# Patient Record
Sex: FEMALE | Race: ASIAN | NOT HISPANIC OR LATINO | URBAN - METROPOLITAN AREA
[De-identification: names, ages, dates, MRNs, and addresses within clinical notes are randomized per-mention and may not be internally consistent; named-entity substitution may affect disease eponyms.]

---

## 2017-01-29 PROBLEM — Z00.00 ENCOUNTER FOR PREVENTIVE HEALTH EXAMINATION: Status: ACTIVE | Noted: 2017-01-29

## 2017-11-19 ENCOUNTER — EMERGENCY (EMERGENCY)
Facility: HOSPITAL | Age: 32
LOS: 1 days | Discharge: HOME | End: 2017-11-19
Attending: EMERGENCY MEDICINE | Admitting: EMERGENCY MEDICINE
Payer: COMMERCIAL

## 2017-11-19 VITALS
TEMPERATURE: 99 F | SYSTOLIC BLOOD PRESSURE: 118 MMHG | DIASTOLIC BLOOD PRESSURE: 79 MMHG | OXYGEN SATURATION: 100 % | HEART RATE: 79 BPM | RESPIRATION RATE: 18 BRPM

## 2017-11-19 PROCEDURE — 99284 EMERGENCY DEPT VISIT MOD MDM: CPT | Mod: 25

## 2017-11-19 PROCEDURE — 99053 MED SERV 10PM-8AM 24 HR FAC: CPT

## 2017-11-19 NOTE — ED PROVIDER NOTE - MEDICAL DECISION MAKING DETAILS
33 y/o F BIB EMS after an argument.  Pt is not displaying any signs of intoxication, she denies SI/HI.  She does not have any complaints and does not appear to be a harm to herself or others.  Pt states she feels safe going home, will dc in care of boyfriend who is coming to pick her up.

## 2017-11-19 NOTE — ED ADULT TRIAGE NOTE - CHIEF COMPLAINT QUOTE
Pt arrives via EMS from home. Pt states she had an argument with her boyfriend and that her brother called 911 stating patient was violent and suicidal. Pt arrives pleasant, calm and cooperative. Admits to having an argument but denies making any suicidal statements. Pt denies psychiatric hx; denies all medical complaints. Per EMS, pt was in the living room watching TV and everything in the room was neat and orderly.

## 2017-11-19 NOTE — ED PROVIDER NOTE - OBJECTIVE STATEMENT
31 y/o F with h/o DM on metformin BIB EMS after an argument with her boyfriend.  Pt states that she had a verbal argument with her boyfriend earlier this evening.  Her brother overhead the argument and called EMS.  Pt was unaware that her brother called EMS and does not know why he called.  EMS reports that they received a call for someone making suicidal threats.  Pt denies physical altercation or assault.  No reported injuries or trauma.  Pt denies any suicidal ideations or thoughts of hurting others.  States her brother left to go DJ a party.  Pt called her boyfriend on the way to the hospital, and he is coming to pick her up.  Pt lives at home with boyfriend, daughter, and mother (daughter is at home with mother currently) and recently invited her brother to move in.  She states she feels safe going home.

## 2019-09-28 ENCOUNTER — EMERGENCY (EMERGENCY)
Facility: HOSPITAL | Age: 34
LOS: 1 days | Discharge: HOME | End: 2019-09-28
Attending: EMERGENCY MEDICINE | Admitting: EMERGENCY MEDICINE
Payer: MEDICAID

## 2019-09-28 VITALS
DIASTOLIC BLOOD PRESSURE: 63 MMHG | TEMPERATURE: 100 F | SYSTOLIC BLOOD PRESSURE: 98 MMHG | HEART RATE: 102 BPM | RESPIRATION RATE: 16 BRPM | OXYGEN SATURATION: 102 %

## 2019-09-28 VITALS
HEART RATE: 100 BPM | RESPIRATION RATE: 19 BRPM | OXYGEN SATURATION: 100 % | TEMPERATURE: 101 F | DIASTOLIC BLOOD PRESSURE: 60 MMHG | SYSTOLIC BLOOD PRESSURE: 105 MMHG

## 2019-09-28 PROBLEM — E11.9 TYPE 2 DIABETES MELLITUS WITHOUT COMPLICATIONS: Chronic | Status: ACTIVE | Noted: 2017-11-19

## 2019-09-28 LAB
ALBUMIN SERPL ELPH-MCNC: 4.1 G/DL — SIGNIFICANT CHANGE UP (ref 3.3–5)
ALP SERPL-CCNC: 65 U/L — SIGNIFICANT CHANGE UP (ref 40–120)
ALT FLD-CCNC: 59 U/L — HIGH (ref 4–33)
ANION GAP SERPL CALC-SCNC: 14 MMO/L — SIGNIFICANT CHANGE UP (ref 7–14)
AST SERPL-CCNC: 52 U/L — HIGH (ref 4–32)
BILIRUB SERPL-MCNC: 0.4 MG/DL — SIGNIFICANT CHANGE UP (ref 0.2–1.2)
BUN SERPL-MCNC: 6 MG/DL — LOW (ref 7–23)
CALCIUM SERPL-MCNC: 9.5 MG/DL — SIGNIFICANT CHANGE UP (ref 8.4–10.5)
CHLORIDE SERPL-SCNC: 99 MMOL/L — SIGNIFICANT CHANGE UP (ref 98–107)
CO2 SERPL-SCNC: 25 MMOL/L — SIGNIFICANT CHANGE UP (ref 22–31)
CREAT SERPL-MCNC: 0.8 MG/DL — SIGNIFICANT CHANGE UP (ref 0.5–1.3)
GLUCOSE SERPL-MCNC: 195 MG/DL — HIGH (ref 70–99)
HCT VFR BLD CALC: 39.7 % — SIGNIFICANT CHANGE UP (ref 34.5–45)
HGB BLD-MCNC: 12.7 G/DL — SIGNIFICANT CHANGE UP (ref 11.5–15.5)
MCHC RBC-ENTMCNC: 28.9 PG — SIGNIFICANT CHANGE UP (ref 27–34)
MCHC RBC-ENTMCNC: 32 % — SIGNIFICANT CHANGE UP (ref 32–36)
MCV RBC AUTO: 90.2 FL — SIGNIFICANT CHANGE UP (ref 80–100)
NRBC # FLD: 0 K/UL — SIGNIFICANT CHANGE UP (ref 0–0)
PLATELET # BLD AUTO: 173 K/UL — SIGNIFICANT CHANGE UP (ref 150–400)
PMV BLD: 9.7 FL — SIGNIFICANT CHANGE UP (ref 7–13)
POTASSIUM SERPL-MCNC: 4 MMOL/L — SIGNIFICANT CHANGE UP (ref 3.5–5.3)
POTASSIUM SERPL-SCNC: 4 MMOL/L — SIGNIFICANT CHANGE UP (ref 3.5–5.3)
PROT SERPL-MCNC: 7.5 G/DL — SIGNIFICANT CHANGE UP (ref 6–8.3)
RBC # BLD: 4.4 M/UL — SIGNIFICANT CHANGE UP (ref 3.8–5.2)
RBC # FLD: 12.6 % — SIGNIFICANT CHANGE UP (ref 10.3–14.5)
SODIUM SERPL-SCNC: 138 MMOL/L — SIGNIFICANT CHANGE UP (ref 135–145)
WBC # BLD: 8.81 K/UL — SIGNIFICANT CHANGE UP (ref 3.8–10.5)
WBC # FLD AUTO: 8.81 K/UL — SIGNIFICANT CHANGE UP (ref 3.8–10.5)

## 2019-09-28 PROCEDURE — 99283 EMERGENCY DEPT VISIT LOW MDM: CPT

## 2019-09-28 RX ORDER — SODIUM CHLORIDE 9 MG/ML
1000 INJECTION INTRAMUSCULAR; INTRAVENOUS; SUBCUTANEOUS ONCE
Refills: 0 | Status: COMPLETED | OUTPATIENT
Start: 2019-09-28 | End: 2019-09-28

## 2019-09-28 RX ORDER — ACETAMINOPHEN 500 MG
650 TABLET ORAL ONCE
Refills: 0 | Status: COMPLETED | OUTPATIENT
Start: 2019-09-28 | End: 2019-09-28

## 2019-09-28 RX ORDER — SODIUM CHLORIDE 9 MG/ML
1000 INJECTION INTRAMUSCULAR; INTRAVENOUS; SUBCUTANEOUS ONCE
Refills: 0 | Status: DISCONTINUED | OUTPATIENT
Start: 2019-09-28 | End: 2019-10-05

## 2019-09-28 RX ORDER — FLUTICASONE PROPIONATE 50 MCG
1 SPRAY, SUSPENSION NASAL ONCE
Refills: 0 | Status: DISCONTINUED | OUTPATIENT
Start: 2019-09-28 | End: 2019-09-28

## 2019-09-28 RX ORDER — IBUPROFEN 200 MG
400 TABLET ORAL ONCE
Refills: 0 | Status: COMPLETED | OUTPATIENT
Start: 2019-09-28 | End: 2019-09-28

## 2019-09-28 RX ADMIN — Medication 400 MILLIGRAM(S): at 18:37

## 2019-09-28 RX ADMIN — Medication 650 MILLIGRAM(S): at 16:34

## 2019-09-28 RX ADMIN — SODIUM CHLORIDE 1000 MILLILITER(S): 9 INJECTION INTRAMUSCULAR; INTRAVENOUS; SUBCUTANEOUS at 16:34

## 2019-09-28 NOTE — ED PROVIDER NOTE - NS ED ROS FT
REVIEW OF SYSTEMS:  General:  +fever, no chills  HEENT: +headache, no vision changes  Cardiac: no chest pain, no palpitations  Respiratory: +cough, no shortness of breath  Gastrointestinal: no abdominal pain, +nausea, +vomiting, no diarrhea  Genitourinary: no hematuria, no dysuria, no urinary frequency  Extremities: no extremity swelling, no extremity pain  Neuro: no focal weakness, no numbness/tingling of the extremities, no decreased sensation  Heme: no easy bleeding, no easy bruising  Skin: no jaundice,  no rashes, no lesions  All other ROS as documented in HPI  -Felipe Burns, PGY-2

## 2019-09-28 NOTE — ED PROVIDER NOTE - PATIENT PORTAL LINK FT
You can access the FollowMyHealth Patient Portal offered by [facility] by registering at the following website: [email or web address]. By joining [employer]’s FollowMyHealth portal, you will also be able to view your health information using other applications (apps) compatible with our system.

## 2019-09-28 NOTE — ED PROVIDER NOTE - CLINICAL SUMMARY MEDICAL DECISION MAKING FREE TEXT BOX
35F flu+ at urgi. Clinically well appearing. tylenol, fluids, labs, ekg. Monitor for sepsis. dispo is likely home 35F PMH DM, flu+ at urgi. Clinically well appearing. tylenol, fluids, labs, ekg. Monitor for sepsis. dispo is likely home

## 2019-09-28 NOTE — ED PROVIDER NOTE - NSFOLLOWUPINSTRUCTIONS_ED_ALL_ED_FT
Please follow up with your PMD in the next few days.     Please see the attached paper for additional information on the flu and when to return to the ER    Viral Respiratory Infection    A viral respiratory infection is an illness that affects parts of the body used for breathing, like the lungs, nose, and throat. It is caused by a germ called a virus. Symptoms can include runny nose, coughing, sneezing, fatigue, body aches, sore throat, fever, or headache. Over the counter medicine can be used to manage the symptoms but the infection typically goes away on its own in 5 to 10 days.     SEEK IMMEDIATE MEDICAL CARE IF YOU HAVE ANY OF THE FOLLOWING SYMPTOMS: shortness of breath, chest pain, fever over 10 days, or lightheadedness/dizziness.

## 2019-09-28 NOTE — ED ADULT NURSE NOTE - OBJECTIVE STATEMENT
break coverage rn: patient arrives a*ox4 ambulatory to room 15 sent in by urgent care for low blood pressure readings and a positive flu b test this afternoon. pt states her daughter was sick and thinks she caught it form her, denies chest pain sob or dizziness, patient appears comfortable and in no distress, resps even and unlabored,spo2 100% on room air, 20G IVSL placed pending further orders at this time, bed in lowest position with call bell in reach, will ctm closely.

## 2019-09-28 NOTE — ED PROVIDER NOTE - OBJECTIVE STATEMENT
34F PMH DM p/w from [facility] care for flu. States has had 3 days of sore throat, chills, tired, body aches, coughing. +flu at [facility]. Vomiting x1, no abdominal pain or urinary sx. Took mucinex. 34F PMH DM p/w from [facility] care for flu. States has had 3 days of sore throat, chills, tired, body aches, coughing. +flu at [facility]. Vomiting x1, no abdominal pain or urinary sx. Took mucinex, no tylenol. Has child who was also sick with flu a few days ago. 34F PMH DM p/w from [facility] care for flu. States has had 3 days of sore throat, chills, tired, body aches, coughing. +flu at [facility]. Vomiting x1, no abdominal pain or urinary sx. Took mucinex, no tylenol. Has child who was also sick with flu a few days ago.    Attending/Ciaarn: 35 yo M w/ Dm noncompliant with Metformin medication, p/w three days of subjective fever/chills, myalgias, nasal congestion, nonproductive cough and fatigue/generalized weakness. +sick contact-daughter

## 2019-09-28 NOTE — ED PROVIDER NOTE - PHYSICAL EXAMINATION
General: Well developed, well nourished, in no acute distress, speaking in full sentence  HEENT: Normocephalic and atraumatic, Trachea midline.   Cardiac: Normal S1 and S2 w/ RRR. No MRG.  Pulmonary: CTA bilaterally. No increased WOB.   Abdominal: Soft, NTND  Neurologic: No focal sensory or motor deficits.  Musculoskeletal: No limited ROM.  Vascular: Warm and well perfused  Skin: Color appropriate for race.   Psychiatric: Appropriate mood and affect. No apparent risk to self or others.  Felipe Burns, PGY-2 General: Well developed, well nourished, in no acute distress, speaking in full sentence  HEENT: Normocephalic and atraumatic, Trachea midline.   Cardiac: Normal S1 and S2 w/ RRR. No MRG.  Pulmonary: CTA bilaterally. No increased WOB.   Abdominal: Soft, NTND  Neurologic: No focal sensory or motor deficits.  Musculoskeletal: No limited ROM.  Vascular: Warm and well perfused  Skin: Color appropriate for race.   Psychiatric: Appropriate mood and affect. No apparent risk to self or others.  Felipe Burns, PGY-2    Attending/Ciaran: non-toxic appearing, NAD; PERRL/EOMI, non-icterus, +nasal-inflammed, clear mucus, supple, no DANY, no JVD, RRR, CTAB; Abd-soft, NT/ND, no HSM; no LE edema, no muscle PT, A&Ox3, nonfocal; Skin-warm/dry/no rashes

## 2019-09-28 NOTE — ED ADULT NURSE NOTE - NSIMPLEMENTINTERV_GEN_ALL_ED
Implemented All Universal Safety Interventions:  [place] to call system. Call bell, personal items and telephone within reach. Instruct patient to call for assistance. Room bathroom lighting operational. Non-slip footwear when patient is off stretcher. Physically safe environment: no spills, clutter or unnecessary equipment. Stretcher in lowest position, wheels locked, appropriate side rails in place.

## 2020-10-27 ENCOUNTER — EMERGENCY (EMERGENCY)
Facility: HOSPITAL | Age: 35
LOS: 1 days | Discharge: HOME | End: 2020-10-27
Attending: EMERGENCY MEDICINE | Admitting: EMERGENCY MEDICINE
Payer: MEDICAID

## 2020-10-27 VITALS
RESPIRATION RATE: 18 BRPM | OXYGEN SATURATION: 100 % | HEART RATE: 77 BPM | SYSTOLIC BLOOD PRESSURE: 105 MMHG | TEMPERATURE: 98 F | DIASTOLIC BLOOD PRESSURE: 65 MMHG

## 2020-10-27 LAB
ALBUMIN SERPL ELPH-MCNC: 3.8 G/DL — SIGNIFICANT CHANGE UP (ref 3.3–5)
ALP SERPL-CCNC: 55 U/L — SIGNIFICANT CHANGE UP (ref 40–120)
ALT FLD-CCNC: 17 U/L — SIGNIFICANT CHANGE UP (ref 4–33)
ANION GAP SERPL CALC-SCNC: 11 MMO/L — SIGNIFICANT CHANGE UP (ref 7–14)
APPEARANCE UR: SIGNIFICANT CHANGE UP
AST SERPL-CCNC: 19 U/L — SIGNIFICANT CHANGE UP (ref 4–32)
BASOPHILS # BLD AUTO: 0.03 K/UL — SIGNIFICANT CHANGE UP (ref 0–0.2)
BASOPHILS NFR BLD AUTO: 0.4 % — SIGNIFICANT CHANGE UP (ref 0–2)
BILIRUB SERPL-MCNC: < 0.2 MG/DL — LOW (ref 0.2–1.2)
BILIRUB UR-MCNC: NEGATIVE — SIGNIFICANT CHANGE UP
BLD GP AB SCN SERPL QL: NEGATIVE — SIGNIFICANT CHANGE UP
BLOOD UR QL VISUAL: SIGNIFICANT CHANGE UP
BUN SERPL-MCNC: 9 MG/DL — SIGNIFICANT CHANGE UP (ref 7–23)
CALCIUM SERPL-MCNC: 9 MG/DL — SIGNIFICANT CHANGE UP (ref 8.4–10.5)
CHLORIDE SERPL-SCNC: 103 MMOL/L — SIGNIFICANT CHANGE UP (ref 98–107)
CO2 SERPL-SCNC: 24 MMOL/L — SIGNIFICANT CHANGE UP (ref 22–31)
COLOR SPEC: SIGNIFICANT CHANGE UP
CREAT SERPL-MCNC: 0.51 MG/DL — SIGNIFICANT CHANGE UP (ref 0.5–1.3)
EOSINOPHIL # BLD AUTO: 0.3 K/UL — SIGNIFICANT CHANGE UP (ref 0–0.5)
EOSINOPHIL NFR BLD AUTO: 3.5 % — SIGNIFICANT CHANGE UP (ref 0–6)
GLUCOSE SERPL-MCNC: 197 MG/DL — HIGH (ref 70–99)
GLUCOSE UR-MCNC: >1000 — HIGH
HCG SERPL-ACNC: 3510 MIU/ML — SIGNIFICANT CHANGE UP
HCT VFR BLD CALC: 36 % — SIGNIFICANT CHANGE UP (ref 34.5–45)
HGB BLD-MCNC: 11.6 G/DL — SIGNIFICANT CHANGE UP (ref 11.5–15.5)
IMM GRANULOCYTES NFR BLD AUTO: 0.4 % — SIGNIFICANT CHANGE UP (ref 0–1.5)
INR BLD: 1.07 — SIGNIFICANT CHANGE UP (ref 0.88–1.16)
KETONES UR-MCNC: SIGNIFICANT CHANGE UP
LEUKOCYTE ESTERASE UR-ACNC: HIGH
LYMPHOCYTES # BLD AUTO: 3.34 K/UL — HIGH (ref 1–3.3)
LYMPHOCYTES # BLD AUTO: 39.2 % — SIGNIFICANT CHANGE UP (ref 13–44)
MCHC RBC-ENTMCNC: 28.9 PG — SIGNIFICANT CHANGE UP (ref 27–34)
MCHC RBC-ENTMCNC: 32.2 % — SIGNIFICANT CHANGE UP (ref 32–36)
MCV RBC AUTO: 89.8 FL — SIGNIFICANT CHANGE UP (ref 80–100)
MONOCYTES # BLD AUTO: 0.57 K/UL — SIGNIFICANT CHANGE UP (ref 0–0.9)
MONOCYTES NFR BLD AUTO: 6.7 % — SIGNIFICANT CHANGE UP (ref 2–14)
NEUTROPHILS # BLD AUTO: 4.26 K/UL — SIGNIFICANT CHANGE UP (ref 1.8–7.4)
NEUTROPHILS NFR BLD AUTO: 49.8 % — SIGNIFICANT CHANGE UP (ref 43–77)
NITRITE UR-MCNC: NEGATIVE — SIGNIFICANT CHANGE UP
NRBC # FLD: 0 K/UL — SIGNIFICANT CHANGE UP (ref 0–0)
PH UR: 7 — SIGNIFICANT CHANGE UP (ref 5–8)
PLATELET # BLD AUTO: 216 K/UL — SIGNIFICANT CHANGE UP (ref 150–400)
PMV BLD: 9.7 FL — SIGNIFICANT CHANGE UP (ref 7–13)
POTASSIUM SERPL-MCNC: 4.2 MMOL/L — SIGNIFICANT CHANGE UP (ref 3.5–5.3)
POTASSIUM SERPL-SCNC: 4.2 MMOL/L — SIGNIFICANT CHANGE UP (ref 3.5–5.3)
PROT SERPL-MCNC: 6.3 G/DL — SIGNIFICANT CHANGE UP (ref 6–8.3)
PROT UR-MCNC: 30 — SIGNIFICANT CHANGE UP
PROTHROM AB SERPL-ACNC: 12.2 SEC — SIGNIFICANT CHANGE UP (ref 10.6–13.6)
RBC # BLD: 4.01 M/UL — SIGNIFICANT CHANGE UP (ref 3.8–5.2)
RBC # FLD: 12.5 % — SIGNIFICANT CHANGE UP (ref 10.3–14.5)
RBC CASTS # UR COMP ASSIST: SIGNIFICANT CHANGE UP (ref 0–?)
RH IG SCN BLD-IMP: POSITIVE — SIGNIFICANT CHANGE UP
SODIUM SERPL-SCNC: 138 MMOL/L — SIGNIFICANT CHANGE UP (ref 135–145)
SP GR SPEC: 1.03 — SIGNIFICANT CHANGE UP (ref 1–1.04)
UROBILINOGEN FLD QL: NORMAL — SIGNIFICANT CHANGE UP
WBC # BLD: 8.53 K/UL — SIGNIFICANT CHANGE UP (ref 3.8–10.5)
WBC # FLD AUTO: 8.53 K/UL — SIGNIFICANT CHANGE UP (ref 3.8–10.5)
WBC UR QL: SIGNIFICANT CHANGE UP (ref 0–?)

## 2020-10-27 PROCEDURE — 76817 TRANSVAGINAL US OBSTETRIC: CPT | Mod: 26

## 2020-10-27 PROCEDURE — 99285 EMERGENCY DEPT VISIT HI MDM: CPT

## 2020-10-27 NOTE — ED PROVIDER NOTE - CLINICAL SUMMARY MEDICAL DECISION MAKING FREE TEXT BOX
36 y/o female with a hx of DM presents to the ER c/o 3 days of vaginal spotting and 1 day of heavy bleeding with clots and cramping. Pt is well appearing, NAD, UCG+ in the ER, will check labs, US, follow up OB/GYN.

## 2020-10-27 NOTE — ED ADULT NURSE NOTE - OBJECTIVE STATEMENT
Received pt in intake room 12 with vaginal bleeding from today with lower abdominal cramping . patient also c/o nausea and headache. No c/o dizziness. +some generalized weakness.  20G IV placed to the left AC. Labs send.

## 2020-10-27 NOTE — ED ADULT TRIAGE NOTE - CHIEF COMPLAINT QUOTE
pt c/o heavy vaginal bleeding and lower abdominal cramps states "I think I'm having a miscarriage." also c/o headache and nausea. LMP 20. . PMH DMT2.

## 2020-10-27 NOTE — ED PROVIDER NOTE - PATIENT PORTAL LINK FT
You can access the FollowMyHealth Patient Portal offered by [facility] by registering at the following website: [email or web address]. By joining [employer]’s FollowMyHealth portal, you will also be able to view your health information using other applications (apps) compatible with our system.

## 2020-10-27 NOTE — ED PROVIDER NOTE - NSFOLLOWUPINSTRUCTIONS_ED_ALL_ED_FT
Advance activity as tolerated.  Continue all previously prescribed medications as directed unless otherwise instructed.  Follow up with your primary care physician in 48-72 hours- bring copies of your results.  Return to the ER for worsening or persistent symptoms, and/or ANY NEW OR CONCERNING SYMPTOMS. If you have issues obtaining follow up, please call: [phone] to obtain a doctor or specialist who takes your insurance in your area.  You may call [phone] to make an appointment with the internal medicine clinic.     Return to the ED in 2 days for repeat evaluation by our OB/GYN team, if bleeding >1 pad per hour return to the ED sooner. Vaginal bleeding including small/medium sized clots and pelvic cramps is expected after taking the medication Cytotec. Take Motrin or Tylenol for pain control.

## 2020-10-27 NOTE — ED PROVIDER NOTE - PROGRESS NOTE DETAILS
BUBBA Richardson. case signed out from BUBBA Armijo, pt w/ incomplete AB, evaluated by OB/GYN, recommending Cytotec 600 buccally, return in 48 hours for repeat eval in ED.

## 2020-10-27 NOTE — ED ADULT NURSE NOTE - NSIMPLEMENTINTERV_GEN_ALL_ED
Implemented All Universal Safety Interventions:  [place] to call system. Call bell, personal items and telephone within reach. Instruct patient to call for assistance. Room bathroom lighting operational. Non-slip footwear when patient is off stretcher. Physically safe environment: no spills, clutter or unnecessary equipment. Stretcher in lowest position, wheels locked, appropriate side rails in place.

## 2020-10-27 NOTE — ED PROVIDER NOTE - OBJECTIVE STATEMENT
36 y/o female with a hx of DM presents to the ER c/o 3 days of vaginal spotting and 1 day of heavy bleeding with clots and cramping.  Pt states she thinks she is pregnant but reports a negative test 2 weeks ago.  LMP .  .  Pt denies fevers, chills, nausea, vomiting, abdominal pain, dysuria, frequency, discharge.

## 2020-10-27 NOTE — ED PROVIDER NOTE - ATTENDING CONTRIBUTION TO CARE
Attending note:   After face to face evaluation of this patient, I concur with above noted hx, pe, and care plan for this patient.  [place]ato: 35 yof with complaints of vaginal bleeding. LMP over 2 months ago with negative ucg one month prior. Today, pt became concerned about heavy clots while in shower, no significant abd pain or nausea, no vomiting, no fever. No trauma. On exam, pt is well appearing, no distress, clear lungs, normal cardiac, abd soft and non tender, pelvic as above, skin and msk normal. Pt is pregnant, by dates 8 weeks, will get labs and US. Threatened ab vs miscarriage.

## 2020-10-28 VITALS
SYSTOLIC BLOOD PRESSURE: 111 MMHG | HEART RATE: 86 BPM | DIASTOLIC BLOOD PRESSURE: 67 MMHG | OXYGEN SATURATION: 100 % | RESPIRATION RATE: 16 BRPM | TEMPERATURE: 98 F

## 2020-10-28 DIAGNOSIS — O36.80X0 PREGNANCY WITH INCONCLUSIVE FETAL VIABILITY, NOT APPLICABLE OR UNSPECIFIED: ICD-10-CM

## 2020-10-28 NOTE — CONSULT NOTE ADULT - SUBJECTIVE AND OBJECTIVE BOX
GYN ED CONSULT NOTE    SUBJECTIVE:    34yo  lmp~20? PMH significant for DM2 presenting with 3 days of vaginal bleeding similar to a menses, followed by one day of heavier bleeding with passage of clots and what looked like tissue at home. Pt states she has irregular menses and thought she had been pregnant about 2 weeks ago. She states she took home pregnancy tests that were neg. She came to the ED tonight because of the bleeding and thought she had a miscarriage when she saw the tissue at home.    She now has been using one pad q3 hours. S      Pt denies fever, chills, nausea, vomiting, diarrhea, headache, constipation, dizzyness, syncope, chest pain, palpitations, shortness of breath, dysuria, urgency, frequency, ,  breast lumps/bumps, dyspareunia.        Primary OB/GYN: none  OBH:   2013- FTNSVD  GYNH: denies hx of fibroids, ov cysts, abnl paps, sti  PMSH:DM2  MEDS: MEFORMIN  ALL: nkda  SOCH: denies t/e/d; safe at home  FAMH: denies fam hx of breast/uterine/ovarian/colon cancer  ROS: negative except per hpi    OBJECTIVE:    VITAL SIGNS:  Vital Signs Last 24 Hrs  T(C): 36.9 (28 Oct 2020 01:41), Max: 36.9 (28 Oct 2020 01:41)  T(F): 98.4 (28 Oct 2020 01:41), Max: 98.4 (28 Oct 2020 01:41)  HR: 86 (28 Oct 2020 01:41) (77 - 86)  BP: 111/67 (28 Oct 2020 01:41) (105/65 - 111/67)  BP(mean): --  RR: 16 (28 Oct 2020 01:41) (16 - 18)  SpO2: 100% (28 Oct 2020 01:41) (100% - 100%)    CAPILLARY BLOOD GLUCOSE      POCT Blood Glucose.: 227 mg/dL (27 Oct 2020 21:25)        PHYSICAL EXAM:  GEN: NAD, A&Ox3  CV: RRR, no m/g/r  LUNGS: CTAB  ABD: soft, NT, ND, +BS  SPECULUM: CERVIX is 0.5cm dilated. No active bleeding with valsalva. Old clots removed    PELVIC: no adnexal tenderness. No CMT tenderness. 0.5cm dilated parous cervix   EXT: WWP, no edema/TTP    LABS:             [id number]=[id number]             11.6   8.53  )-----------( 216      ( 27 Oct 2020 22:29 )             36.0   baso 0.4    eos 3.5    imm gran 0.4    lymph 39.2   mono 6.7    poly 49.8       10-27    138  |  103  |  9   ----------------------------<  197<H>  4.2   |  24  |  0.51    Ca    9.0      27 Oct 2020 22:29      TPro  6.3  /  Alb  3.8  /  TBili  < 0.2<L>  /  DBili  x   /  AST  19  /  ALT  17  /  AlkPhos  55  10-27      Urinalysis Basic - ( 27 Oct 2020 23:05 )    Color: ORANGE / Appearance: TURBID / S.029 / pH: 7.0  Gluc: >1000 / Ketone: TRACE  / Bili: NEGATIVE / Urobili: NORMAL   Blood: LARGE / Protein: 30 / Nitrite: NEGATIVE   Leuk Esterase: SMALL / RBC: TNTC / WBC 5-10   Sq Epi: x / Non Sq Epi: x / Bacteria: x        RADIOLOGY:    ra< from: US Transvaginal, OB (10.27.20 @ 23:43) >    EXAM:  US OB TRANSVAGINAL        PROCEDURE DATE:  Oct 27 2020         INTERPRETATION:  CLINICAL INFORMATION: Pregnant. Vaginal bleeding.    LMP: 2020.    Estimated Gestational Age by LMP: 11 weeks 0 days.    COMPARISON: None available.    Endovaginal and transabdominal pelvic sonogram. Color and Spectral Doppler was performed.    FINDINGS:  Uterus: 10.5 x 6.1 x 5.3 cm.  No intrauterine gestation is visualized. The endometrium is heterogenous, measuring 24 mm.    Right ovary: 1.7 x 2.3 x 2.0 cm. Within normal limits. Normal arterial and venous waveforms.  Left ovary: 2.9 x 2.7 x 2.1 cm. Within normal limits. Normal arterial and venous waveforms.    Fluid: None.    IMPRESSION:  No intrauterine gestation is visualized. The endometrium is thickened, measuring 24 mm and appears heterogenous. There is concern for pregnancy miscarriage. Recommend immediate OB/GYN consultation.    The above findings were discussed with Dr. Richardson at 2:10 AM on 10/28/20.            JIMY PARKER MD; Resident Radiology  This document has been electronically signed.  AMADOU STAFFORD MD; Attending Radiologist  This document has been electronically signed. Oct 28 2020  2:33AM    < end of copied text >   GYN ED CONSULT NOTE    SUBJECTIVE:    36yo  lmp~20? PMH significant for DM2 presenting with 3 days of vaginal bleeding similar to a menses, followed by one day of heavier bleeding with passage of clots and what looked like tissue at home. Pt states she has irregular menses and thought she had been pregnant about 2 weeks ago. She states she took home pregnancy tests that were neg. She came to the ED tonight because of the bleeding and thought she had a miscarriage when she saw the tissue at home. She had some pelvic cramping earlier today, has not needed anything for pain    She now has been using one pad q3 hours. She has minimal pelvic cramping, less than a menses.       Pt denies fever, chills, nausea, vomiting, diarrhea, headache, constipation, dizzyness, syncope, chest pain, palpitations, shortness of breath, dysuria, urgency, frequency, ,  breast lumps/bumps, dyspareunia.        Primary OB/GYN: none  OBH:   2013- FTNSVD  GYNH: denies hx of fibroids, ov cysts, abnl paps, sti  PMSH:DM2  MEDS: MEFORMIN  ALL: nkda  SOCH: denies t/e/d; safe at home  FAMH: denies fam hx of breast/uterine/ovarian/colon cancer  ROS: negative except per hpi    OBJECTIVE:    VITAL SIGNS:  Vital Signs Last 24 Hrs  T(C): 36.9 (28 Oct 2020 01:41), Max: 36.9 (28 Oct 2020 01:41)  T(F): 98.4 (28 Oct 2020 01:41), Max: 98.4 (28 Oct 2020 01:41)  HR: 86 (28 Oct 2020 01:41) (77 - 86)  BP: 111/67 (28 Oct 2020 01:41) (105/65 - 111/67)  BP(mean): --  RR: 16 (28 Oct 2020 01:41) (16 - 18)  SpO2: 100% (28 Oct 2020 01:41) (100% - 100%)    CAPILLARY BLOOD GLUCOSE      POCT Blood Glucose.: 227 mg/dL (27 Oct 2020 21:25)        PHYSICAL EXAM:  GEN: NAD, A&Ox3  CV: RRR, no m/g/r  LUNGS: CTAB  ABD: soft, NT, ND, +BS  SPECULUM: CERVIX is 0.5cm dilated. No active bleeding with valsalva. Old clots removed    PELVIC: no adnexal tenderness. No CMT tenderness. 0.5cm dilated parous cervix   EXT: WWP, no edema/TTP    LABS:             nfkr=[id number]             11.6   8.53  )-----------( 216      ( 27 Oct 2020 22:29 )             36.0   baso 0.4    eos 3.5    imm gran 0.4    lymph 39.2   mono 6.7    poly 49.8       10-27    138  |  103  |  9   ----------------------------<  197<H>  4.2   |  24  |  0.51    Ca    9.0      27 Oct 2020 22:29      TPro  6.3  /  Alb  3.8  /  TBili  < 0.2<L>  /  DBili  x   /  AST  19  /  ALT  17  /  AlkPhos  55  10-27      Urinalysis Basic - ( 27 Oct 2020 23:05 )    Color: ORANGE / Appearance: TURBID / S.029 / pH: 7.0  Gluc: >1000 / Ketone: TRACE  / Bili: NEGATIVE / Urobili: NORMAL   Blood: LARGE / Protein: 30 / Nitrite: NEGATIVE   Leuk Esterase: SMALL / RBC: TNTC / WBC 5-10   Sq Epi: x / Non Sq Epi: x / Bacteria: x        RADIOLOGY:    ra< from: US Transvaginal, OB (10.27.20 @ 23:43) >    EXAM:  US OB TRANSVAGINAL        PROCEDURE DATE:  Oct 27 2020         INTERPRETATION:  CLINICAL INFORMATION: Pregnant. Vaginal bleeding.    LMP: 2020.    Estimated Gestational Age by LMP: 11 weeks 0 days.    COMPARISON: None available.    Endovaginal and transabdominal pelvic sonogram. Color and Spectral Doppler was performed.    FINDINGS:  Uterus: 10.5 x 6.1 x 5.3 cm.  No intrauterine gestation is visualized. The endometrium is heterogenous, measuring 24 mm.    Right ovary: 1.7 x 2.3 x 2.0 cm. Within normal limits. Normal arterial and venous waveforms.  Left ovary: 2.9 x 2.7 x 2.1 cm. Within normal limits. Normal arterial and venous waveforms.    Fluid: None.    IMPRESSION:  No intrauterine gestation is visualized. The endometrium is thickened, measuring 24 mm and appears heterogenous. There is concern for pregnancy miscarriage. Recommend immediate OB/GYN consultation.    The above findings were discussed with Dr. Richardson at 2:10 AM on 10/28/20.            JIMY PARKER MD; Resident Radiology  This document has been electronically signed.  AMADOU STAFFORD MD; Attending Radiologist  This document has been electronically signed. Oct 28 2020  2:33AM    < end of copied text >   GYN ED CONSULT NOTE    SUBJECTIVE:    36yo  LMP~20? PMH significant for DM2 presenting with 3 days of vaginal bleeding similar to a menses, followed by one day of heavier bleeding with passage of clots and what looked like tissue at home. Pt states she has irregular menses and thought she had been pregnant about 2 weeks ago. She states she took home pregnancy tests that were neg. She came to the ED tonight because of the bleeding and thought she had a miscarriage when she saw the tissue at home. She had some pelvic cramping earlier today, has not needed anything for pain    She now has been using one pad q3 hours. She has minimal pelvic cramping, less than a menses.       Pt denies fever, chills, nausea, vomiting, diarrhea, headache, constipation, dizzyness, syncope, chest pain, palpitations, shortness of breath, dysuria, urgency, frequency, ,  breast lumps/bumps, dyspareunia.        Primary OB/GYN: none  OBH:   2013- FTNSVD  GYNH: denies hx of fibroids, ov cysts, abnl paps, sti  PMSH:DM2  MEDS: MEFORMIN  ALL: nkda  SOCH: denies t/e/d; safe at home  FAMH: denies fam hx of breast/uterine/ovarian/colon cancer  ROS: negative except per hpi    OBJECTIVE:    VITAL SIGNS:  Vital Signs Last 24 Hrs  T(C): 36.9 (28 Oct 2020 01:41), Max: 36.9 (28 Oct 2020 01:41)  T(F): 98.4 (28 Oct 2020 01:41), Max: 98.4 (28 Oct 2020 01:41)  HR: 86 (28 Oct 2020 01:41) (77 - 86)  BP: 111/67 (28 Oct 2020 01:41) (105/65 - 111/67)  BP(mean): --  RR: 16 (28 Oct 2020 01:41) (16 - 18)  SpO2: 100% (28 Oct 2020 01:41) (100% - 100%)    CAPILLARY BLOOD GLUCOSE      POCT Blood Glucose.: 227 mg/dL (27 Oct 2020 21:25)        PHYSICAL EXAM:  GEN: NAD, A&Ox3  CV: RRR, no m/g/r  LUNGS: CTAB  ABD: soft, NT, ND, +BS  SPECULUM: CERVIX is 0.5cm dilated. No active bleeding with valsalva. Old clots removed    PELVIC: no adnexal tenderness. No CMT tenderness. 0.5cm dilated parous cervix   EXT: WWP, no edema/TTP    LABS:             tetx=5109             11.6   8.53  )-----------( 216      ( 27 Oct 2020 22:29 )             36.0   baso 0.4    eos 3.5    imm gran 0.4    lymph 39.2   mono 6.7    poly 49.8       10-27    138  |  103  |  9   ----------------------------<  197<H>  4.2   |  24  |  0.51    Ca    9.0      27 Oct 2020 22:29      TPro  6.3  /  Alb  3.8  /  TBili  < 0.2<L>  /  DBili  x   /  AST  19  /  ALT  17  /  AlkPhos  55  10-27      Urinalysis Basic - ( 27 Oct 2020 23:05 )    Color: ORANGE / Appearance: TURBID / S.029 / pH: 7.0  Gluc: >1000 / Ketone: TRACE  / Bili: NEGATIVE / Urobili: NORMAL   Blood: LARGE / Protein: 30 / Nitrite: NEGATIVE   Leuk Esterase: SMALL / RBC: TNTC / WBC 5-10   Sq Epi: x / Non Sq Epi: x / Bacteria: x        RADIOLOGY:    ra< from: US Transvaginal, OB (10.27.20 @ 23:43) >    EXAM:  US OB TRANSVAGINAL        PROCEDURE DATE:  Oct 27 2020         INTERPRETATION:  CLINICAL INFORMATION: Pregnant. Vaginal bleeding.    LMP: 2020.    Estimated Gestational Age by LMP: 11 weeks 0 days.    COMPARISON: None available.    Endovaginal and transabdominal pelvic sonogram. Color and Spectral Doppler was performed.    FINDINGS:  Uterus: 10.5 x 6.1 x 5.3 cm.  No intrauterine gestation is visualized. The endometrium is heterogenous, measuring 24 mm.    Right ovary: 1.7 x 2.3 x 2.0 cm. Within normal limits. Normal arterial and venous waveforms.  Left ovary: 2.9 x 2.7 x 2.1 cm. Within normal limits. Normal arterial and venous waveforms.    Fluid: None.    IMPRESSION:  No intrauterine gestation is visualized. The endometrium is thickened, measuring 24 mm and appears heterogenous. There is concern for pregnancy miscarriage. Recommend immediate OB/GYN consultation.    The above findings were discussed with Dr. Richardson at 2:10 AM on 10/28/20.            JIMY PARKER MD; Resident Radiology  This document has been electronically signed.  AMADOU STAFFORD MD; Attending Radiologist  This document has been electronically signed. Oct 28 2020  2:33AM    < end of copied text >

## 2020-10-28 NOTE — CONSULT NOTE ADULT - ATTENDING COMMENTS
36y/o  LMP uncertain ? 20 c/o vaginal bleeding TcFN=[id number]. No clinical or radiological evidence of ectopic pregnancy, pregnancy of unknown location - likely SAB. To f/u in 48 hrs.

## 2020-10-28 NOTE — CONSULT NOTE ADULT - PROBLEM SELECTOR RECOMMENDATION 9
-misoprostol 600mcg buccal   -pt will experience pelvic cramping and vaginal bleeding. Pt instructed to return to ED if she develops heavy vaginal bleeding, pelvic pain, or signs of infection. Bleeding/infection/ectopic precautions reviewed with pt.  -H/H is 11.6/36  -Rh pos  -[facility]      Pt evaluated by Dr. De La Garza  noah, [email or web address]

## 2020-10-28 NOTE — CONSULT NOTE ADULT - ASSESSMENT
34yo  lmp~20? PMH significant for DM2 presenting with 3 days of vaginal bleeding similar to a menses, followed by one day of heavier bleeding with passage of clots and what looked like tissue at home. 36yo  lmp~20? PMH significant for DM2 presenting with 3 days of vaginal bleeding similar to a menses, followed by one day of heavier bleeding with passage of clots and what looked like tissue at home. Diagnosis most likely miscarriage/incomplete AB, however no confirmed IUP. scoq=[id number]. No clinical or radiological evidence of ectopic pregnancy.

## 2020-10-29 LAB
CULTURE RESULTS: SIGNIFICANT CHANGE UP
SPECIMEN SOURCE: SIGNIFICANT CHANGE UP

## 2020-10-29 NOTE — CHART NOTE - NSCHARTNOTEFT_GEN_A_CORE
GYN TELEPHONE NOTE    34yo  p/w VB, passage of clots/tissue at home 10/27.   bHC (10/27), TVUS (10/27): No IUP; heterogeneous endometrium, 24mm.  She received cytotec 600mcg for presumed incomplete AB  10/28.  Reports has not yet had heavy vaginal bleeding as was explained in ED.  Called pt to confirm f/u plan for 10/30.   Pt will try to come in early if can arrange for ; otherwise will come in afternoon.   []repeat bHCG  []TVUS    Shruthi Laverne R2

## 2020-10-30 ENCOUNTER — EMERGENCY (EMERGENCY)
Facility: HOSPITAL | Age: 35
LOS: 1 days | Discharge: HOME | End: 2020-10-30
Attending: EMERGENCY MEDICINE | Admitting: EMERGENCY MEDICINE
Payer: MEDICAID

## 2020-10-30 VITALS
SYSTOLIC BLOOD PRESSURE: 121 MMHG | OXYGEN SATURATION: 100 % | DIASTOLIC BLOOD PRESSURE: 69 MMHG | RESPIRATION RATE: 18 BRPM | TEMPERATURE: 97 F | HEART RATE: 87 BPM

## 2020-10-30 DIAGNOSIS — O03.9 COMPLETE OR UNSPECIFIED SPONTANEOUS ABORTION WITHOUT COMPLICATION: ICD-10-CM

## 2020-10-30 LAB
ALBUMIN SERPL ELPH-MCNC: 3.9 G/DL — SIGNIFICANT CHANGE UP (ref 3.3–5)
ALP SERPL-CCNC: 57 U/L — SIGNIFICANT CHANGE UP (ref 40–120)
ALT FLD-CCNC: 18 U/L — SIGNIFICANT CHANGE UP (ref 4–33)
ANION GAP SERPL CALC-SCNC: 11 MMO/L — SIGNIFICANT CHANGE UP (ref 7–14)
APPEARANCE UR: SIGNIFICANT CHANGE UP
APTT BLD: 30.9 SEC — SIGNIFICANT CHANGE UP (ref 27–36.3)
AST SERPL-CCNC: 16 U/L — SIGNIFICANT CHANGE UP (ref 4–32)
BACTERIA # UR AUTO: NEGATIVE — SIGNIFICANT CHANGE UP
BASOPHILS # BLD AUTO: 0.03 K/UL — SIGNIFICANT CHANGE UP (ref 0–0.2)
BASOPHILS NFR BLD AUTO: 0.3 % — SIGNIFICANT CHANGE UP (ref 0–2)
BILIRUB SERPL-MCNC: < 0.2 MG/DL — LOW (ref 0.2–1.2)
BILIRUB UR-MCNC: NEGATIVE — SIGNIFICANT CHANGE UP
BLD GP AB SCN SERPL QL: NEGATIVE — SIGNIFICANT CHANGE UP
BLOOD UR QL VISUAL: HIGH
BUN SERPL-MCNC: 10 MG/DL — SIGNIFICANT CHANGE UP (ref 7–23)
CALCIUM SERPL-MCNC: 9.2 MG/DL — SIGNIFICANT CHANGE UP (ref 8.4–10.5)
CHLORIDE SERPL-SCNC: 99 MMOL/L — SIGNIFICANT CHANGE UP (ref 98–107)
CO2 SERPL-SCNC: 25 MMOL/L — SIGNIFICANT CHANGE UP (ref 22–31)
COLOR SPEC: SIGNIFICANT CHANGE UP
CREAT SERPL-MCNC: 0.62 MG/DL — SIGNIFICANT CHANGE UP (ref 0.5–1.3)
EOSINOPHIL # BLD AUTO: 0.29 K/UL — SIGNIFICANT CHANGE UP (ref 0–0.5)
EOSINOPHIL NFR BLD AUTO: 3.4 % — SIGNIFICANT CHANGE UP (ref 0–6)
GLUCOSE SERPL-MCNC: 320 MG/DL — HIGH (ref 70–99)
GLUCOSE UR-MCNC: >1000 — HIGH
HCG SERPL-ACNC: 431.1 MIU/ML — SIGNIFICANT CHANGE UP
HCT VFR BLD CALC: 36.2 % — SIGNIFICANT CHANGE UP (ref 34.5–45)
HGB BLD-MCNC: 11.7 G/DL — SIGNIFICANT CHANGE UP (ref 11.5–15.5)
IMM GRANULOCYTES NFR BLD AUTO: 0.1 % — SIGNIFICANT CHANGE UP (ref 0–1.5)
INR BLD: 1.11 — SIGNIFICANT CHANGE UP (ref 0.88–1.16)
KETONES UR-MCNC: SIGNIFICANT CHANGE UP
LEUKOCYTE ESTERASE UR-ACNC: NEGATIVE — SIGNIFICANT CHANGE UP
LYMPHOCYTES # BLD AUTO: 3.38 K/UL — HIGH (ref 1–3.3)
LYMPHOCYTES # BLD AUTO: 39.4 % — SIGNIFICANT CHANGE UP (ref 13–44)
MCHC RBC-ENTMCNC: 29.3 PG — SIGNIFICANT CHANGE UP (ref 27–34)
MCHC RBC-ENTMCNC: 32.3 % — SIGNIFICANT CHANGE UP (ref 32–36)
MCV RBC AUTO: 90.5 FL — SIGNIFICANT CHANGE UP (ref 80–100)
MONOCYTES # BLD AUTO: 0.49 K/UL — SIGNIFICANT CHANGE UP (ref 0–0.9)
MONOCYTES NFR BLD AUTO: 5.7 % — SIGNIFICANT CHANGE UP (ref 2–14)
NEUTROPHILS # BLD AUTO: 4.38 K/UL — SIGNIFICANT CHANGE UP (ref 1.8–7.4)
NEUTROPHILS NFR BLD AUTO: 51.1 % — SIGNIFICANT CHANGE UP (ref 43–77)
NITRITE UR-MCNC: NEGATIVE — SIGNIFICANT CHANGE UP
NRBC # FLD: 0 K/UL — SIGNIFICANT CHANGE UP (ref 0–0)
PH UR: 6 — SIGNIFICANT CHANGE UP (ref 5–8)
PLATELET # BLD AUTO: 223 K/UL — SIGNIFICANT CHANGE UP (ref 150–400)
PMV BLD: 10 FL — SIGNIFICANT CHANGE UP (ref 7–13)
POTASSIUM SERPL-MCNC: 3.8 MMOL/L — SIGNIFICANT CHANGE UP (ref 3.5–5.3)
POTASSIUM SERPL-SCNC: 3.8 MMOL/L — SIGNIFICANT CHANGE UP (ref 3.5–5.3)
PROT SERPL-MCNC: 6.9 G/DL — SIGNIFICANT CHANGE UP (ref 6–8.3)
PROT UR-MCNC: 30 — SIGNIFICANT CHANGE UP
PROTHROM AB SERPL-ACNC: 12.6 SEC — SIGNIFICANT CHANGE UP (ref 10.6–13.6)
RBC # BLD: 4 M/UL — SIGNIFICANT CHANGE UP (ref 3.8–5.2)
RBC # FLD: 12.5 % — SIGNIFICANT CHANGE UP (ref 10.3–14.5)
RBC CASTS # UR COMP ASSIST: >50 — HIGH (ref 0–?)
RH IG SCN BLD-IMP: POSITIVE — SIGNIFICANT CHANGE UP
SODIUM SERPL-SCNC: 135 MMOL/L — SIGNIFICANT CHANGE UP (ref 135–145)
SP GR SPEC: 1.03 — SIGNIFICANT CHANGE UP (ref 1–1.04)
SQUAMOUS # UR AUTO: SIGNIFICANT CHANGE UP
UROBILINOGEN FLD QL: NORMAL — SIGNIFICANT CHANGE UP
WBC # BLD: 8.58 K/UL — SIGNIFICANT CHANGE UP (ref 3.8–10.5)
WBC # FLD AUTO: 8.58 K/UL — SIGNIFICANT CHANGE UP (ref 3.8–10.5)
WBC UR QL: SIGNIFICANT CHANGE UP (ref 0–?)

## 2020-10-30 PROCEDURE — 93975 VASCULAR STUDY: CPT | Mod: 26

## 2020-10-30 PROCEDURE — 76830 TRANSVAGINAL US NON-OB: CPT | Mod: 26

## 2020-10-30 PROCEDURE — 99284 EMERGENCY DEPT VISIT MOD MDM: CPT

## 2020-10-30 PROCEDURE — 76817 TRANSVAGINAL US OBSTETRIC: CPT | Mod: 26

## 2020-10-30 NOTE — ED PROVIDER NOTE - PROGRESS NOTE DETAILS
BUBBA Granados: Labs reviewed, beta hcg trending down. TVUS shows no IUP and no clinical evidence of ectopic pregnancy in the adnexal region. HD stable, non-acute abdomen. Seen by OB/GYN. Stable for discharge home. GYN follow up next week. Strict return precautions.

## 2020-10-30 NOTE — ED ADULT TRIAGE NOTE - CHIEF COMPLAINT QUOTE
pt arriving for repeat f/u  hcg levels s/p miscarriage 10/27. PMH DM. Appears comfortable. endorsing mild abdominal cramping

## 2020-10-30 NOTE — ED PROVIDER NOTE - PATIENT PORTAL LINK FT
You can access the FollowMyHealth Patient Portal offered by [facility] by registering at the following website: [email or web address]. By joining [employer]’s FollowMyHealth portal, you will also be able to view your health information using other applications (apps) compatible with our system.

## 2020-10-30 NOTE — CONSULT NOTE ADULT - PROBLEM SELECTOR RECOMMENDATION 9
-Pt counseled on need to follow bhcg to < 5  -Pt to follow up in 1 week with [facility] at [address] [place] [address]. [phone]. Pt to let  staff know she needs to be seen for ED follow up  - Bleeding precautions reviewed. Pt advised to return to the ED is she is soaking through 1 large pad/hour for > 2 hours, clot passage larger than fist-sized, experiences lightheadedness, dizziness, vomiting, inability to tolerate PO.     D/w Dr. King lowery pgy3

## 2020-10-30 NOTE — CONSULT NOTE ADULT - ASSESSMENT
34 y/o  LMP  [place] [facility] [place]. Pt initially presented on 10/28 with vaginal bleeding and tissue passage at home. Bhcg trend as follows: 3510 (10/28)-> 431 (10/30). TVUS without evidence of retained POC. Pt likely with complete AB

## 2020-10-30 NOTE — ED PROVIDER NOTE - OBJECTIVE STATEMENT
35 year old female with PMH of DM, , LMP 20 presents to the ED as recall for evaluation of pregnancy of unknown location. Pt is s/p Cytotec after she presented with 3 days of vaginal bleeding which she states was similar to her menses, and the following day bleeding became heavier with passage of clots and tissues at home. Pt states she received Cytotec and experienced no pelvic cramping or vaginal bleeding. At this time, pt denies any complaints, denies abdominal pain, pelvic pain, nausea, vomiting, shoulder pain, dizziness, syncope, fevers and chills. Pt denies any other complaints.

## 2020-10-30 NOTE — CONSULT NOTE ADULT - SUBJECTIVE AND OBJECTIVE BOX
CHIQUITA ALLEN  35y  Female [id number]    HPI: 36 y/o  LMP  presents for [facility] [place]. Pt was initially seen in the ED on 10/28 with vaginal bleeding and passage of tissue at home. [facility] at that time was 3510. TVUS showed hetergenous thickened endometrium. Pt was given cytotec to aid in completion of uterine emptying.  Pt feels well today. Reports vaginal bleeding like a light period. Denies further tissue passage. Reports mild cramps, not taking PO pain medication. Denies lightheadedness/dizziness. Denies f/c/n/v.       Primary OB/GYN: none  OBH: 2013- FTNSVD  GYNH: denies hx of fibroids, ov cysts, abnl paps, sti  PMSH:DM2  MEDS: MEFORMIN  ALL: nkda  SOCH: denies t/e/d; safe at home    Vital Signs Last 24 Hrs  T(C): 36.2 (30 Oct 2020 09:35), Max: 36.2 (30 Oct 2020 09:35)  T(F): 97.2 (30 Oct 2020 09:35), Max: 97.2 (30 Oct 2020 09:35)  HR: 87 (30 Oct 2020 09:35) (87 - 87)  BP: 121/69 (30 Oct 2020 09:35) (121/69 - 121/69)  BP(mean): --  RR: 18 (30 Oct 2020 09:35) (18 - 18)  SpO2: 100% (30 Oct 2020 09:35) (100% - 100%)    Physical Exam:   General: sitting comfortably in bed, NAD   HEENT: neck supple, full ROM  CV: RR S1S2 no m/r/g  Lungs: CTA b/l, good air flow b/l   Back: No CVA tenderness  Abd: Soft, non-tender, non-distended.  Bowel sounds present.    :  No bleeding on pad.   Ext: non-tender b/l, no edema     LABS:                              11.7   8.58  )-----------( 223      ( 30 Oct 2020 10:15 )             36.2     10-30    135  |  99  |  10  ----------------------------<  320<H>  3.8   |  25  |  0.62    Ca    9.2      30 Oct 2020 10:15    TPro  6.9  /  Alb  3.9  /  TBili  < 0.2<L>  /  DBili  x   /  AST  16  /  ALT  18  /  AlkPhos  57  10-30    I&O's Detail    PT/INR - ( 30 Oct 2020 10:15 )   PT: 12.6 SEC;   INR: 1.11          PTT - ( 30 Oct 2020 10:15 )  PTT:30.9 SEC  Urinalysis Basic - ( 30 Oct 2020 10:15 )    Color: LT RED / Appearance: TURBID / S.028 / pH: 6.0  Gluc: >1000 / Ketone: TRACE  / Bili: NEGATIVE / Urobili: NORMAL   Blood: LARGE / Protein: 30 / Nitrite: NEGATIVE   Leuk Esterase: NEGATIVE / RBC: >50 / WBC 3-5   Sq Epi: FEW / Non Sq Epi: x / Bacteria: NEGATIVE          RADIOLOGY & ADDITIONAL STUDIES:    r< from: US Transvaginal (10.30.20 @ 10:51) >  EXAM:  US TRANSVAGINAL        PROCEDURE DATE:  Oct 30 2020         INTERPRETATION:  CLINICAL INFORMATION: Pregnancy of unknown location, beta hCG today pending, beta-hCG on 10/27/2020 was 3510, status post Cytotec    LMP: 2020    COMPARISON: Ultrasound pelvis 10/27/2020    TECHNIQUE:  Endovaginal pelvic sonogram as per order. Transabdominal pelvic sonogram was also performed as part of our standard protocol. Color and Spectral Doppler was performed.    FINDINGS:    Uterus: 9.3 x 5.4 x 5.6 cm. No intrauterine gestational sac.    Endometrium: 1.7 cm, which is thickened and heterogeneous. No increased vascularity    Right ovary: 2.1 x 2.4 x 1.6 cm. Within normal limits. Normal arterial and venous waveforms.  Left ovary: 2.2 x 2.7 x 1.6 cm. Within normal limits. Normal arterial and venous waveforms.    Fluid: None.    IMPRESSION:    Thickened heterogeneous endometrium without increased vascularity, as seen on prior ultrasound. No evidence of vascular retained products of conception.    < end of copied text >  
no

## 2020-10-30 NOTE — ED PROVIDER NOTE - ATTENDING CONTRIBUTION TO CARE
Pt was seen and evaluated by me. Pt is a 35 y/ female with PMHx of DM type 2,  with LMP 20 who presented to the ED for repeat labs with pregnancy of unknown location. Pt was seen here 3 days ago where she was noted to have an elevated HCG and US showing a pregnancy of unknown location vs miscarriage. Pt was given Cytotec and noted she had minimal bleeding. Pt denies any fever, chills, nausea, vomiting, SOB, chest pain, or abd pain. Lungs CTA b/l. RRR. Abd soft, non-tender.  Concern for ectopic/miscarriage  Labs, US, UA

## 2020-10-30 NOTE — ED PROVIDER NOTE - NSFOLLOWUPINSTRUCTIONS_ED_ALL_ED_FT
Please follow up with OB/GYN next week.   Kindly call to make an appointment within 2 days.    If you have any new, worsened or concerning symptoms including but not limited to worsening pelvic pain, heavy vaginal bleeding, fevers and chills, please return to the emergency department immediately.

## 2020-10-30 NOTE — ED PROVIDER NOTE - CLINICAL SUMMARY MEDICAL DECISION MAKING FREE TEXT BOX
35 year old female with PMH of DM, , LMP 20 presents to the ED as recall for evaluation of pregnancy of unknown location. Pt is s/p Cytotec after she presented with 3 days of vaginal bleeding, beta hcg remains high in the 3000 range with no IUP on US. HD stable. Concern for ectopic pregnancy. Plan for labs, TVUS, OB/GYN consult, monitor and reassess.

## 2020-10-30 NOTE — ED ADULT NURSE NOTE - OBJECTIVE STATEMENT
abdominal cramps and HCG level checks s/p miscarriage on 10/27. Patient AOx3, appears comfortable, no new distress, reports having lighter vaginal bleeding.

## 2020-11-02 ENCOUNTER — ASOB RESULT (OUTPATIENT)
Age: 35
End: 2020-11-02

## 2020-11-02 ENCOUNTER — APPOINTMENT (OUTPATIENT)
Dept: OBGYN | Facility: CLINIC | Age: 35
End: 2020-11-02
Payer: MEDICAID

## 2020-11-02 VITALS
BODY MASS INDEX: 34.75 KG/M2 | WEIGHT: 208.6 LBS | HEIGHT: 65 IN | HEART RATE: 163 BPM | TEMPERATURE: 98.1 F | DIASTOLIC BLOOD PRESSURE: 85 MMHG | SYSTOLIC BLOOD PRESSURE: 123 MMHG

## 2020-11-02 DIAGNOSIS — E11.9 TYPE 2 DIABETES MELLITUS W/OUT COMPLICATIONS: ICD-10-CM

## 2020-11-02 DIAGNOSIS — O03.4 INCOMPLETE SPONTANEOUS ABORTION W/OUT COMPLICATION: ICD-10-CM

## 2020-11-02 PROCEDURE — 99072 ADDL SUPL MATRL&STAF TM PHE: CPT

## 2020-11-02 PROCEDURE — 76830 TRANSVAGINAL US NON-OB: CPT

## 2020-11-02 PROCEDURE — 99203 OFFICE O/P NEW LOW 30 MIN: CPT

## 2020-11-02 PROCEDURE — 76817 TRANSVAGINAL US OBSTETRIC: CPT

## 2020-11-03 LAB
ABO + RH PNL BLD: NORMAL
BLD GP AB SCN SERPL QL: NORMAL
HCG SERPL-MCNC: 79 MIU/ML

## 2020-11-04 PROBLEM — E11.9 DIABETES MELLITUS, TYPE 2: Status: ACTIVE | Noted: 2020-11-04

## 2020-11-04 NOTE — HISTORY OF PRESENT ILLNESS
[FreeTextEntry1] :  34 y/o  LMP  presents for follow up for incomplete  s/p cytotec in the ER. Pt was initially seen\par in the ED on 10/28 with vaginal bleeding and passage of tissue at home. bhcg at\par that time was 3510. TVUS showed hetergenous thickened endometrium. Pt was given\par cytotec to aid in completion of uterine emptying.\par Pt feels well today. Reports vaginal bleeding like a light period. Denies\par further tissue passage. Reports mild cramps, not taking PO pain medication.\par Denies lightheadedness/dizziness. Denies f/c/n/v.\par \par \par Primary OB/GYN: none\par OBH: 2013- FTNSVD\par GYNH: denies hx of fibroids, ov cysts, abnl paps, sti\par PMSH:DM2\par MEDS: MEFORMIN\par ALL: nkda\par SOCH: denies t/e/d; safe at home\par

## 2020-11-04 NOTE — PLAN
[FreeTextEntry1] : s/p incomplete  s/p cytotec in the ER. CG trending down\par will draw HCG and blood type today

## 2020-11-10 ENCOUNTER — APPOINTMENT (OUTPATIENT)
Dept: OBGYN | Facility: CLINIC | Age: 35
End: 2020-11-10

## 2020-11-11 LAB — HCG SERPL-MCNC: 7 MIU/ML

## 2020-11-17 ENCOUNTER — APPOINTMENT (OUTPATIENT)
Dept: OBGYN | Facility: CLINIC | Age: 35
End: 2020-11-17

## 2020-11-18 LAB — HCG SERPL-MCNC: 3 MIU/ML

## 2020-11-24 ENCOUNTER — NON-APPOINTMENT (OUTPATIENT)
Age: 35
End: 2020-11-24

## 2020-11-30 ENCOUNTER — APPOINTMENT (OUTPATIENT)
Dept: OBGYN | Facility: CLINIC | Age: 35
End: 2020-11-30
Payer: MEDICAID

## 2020-11-30 ENCOUNTER — ASOB RESULT (OUTPATIENT)
Age: 35
End: 2020-11-30

## 2020-11-30 VITALS
TEMPERATURE: 98.4 F | SYSTOLIC BLOOD PRESSURE: 110 MMHG | HEART RATE: 78 BPM | HEIGHT: 65 IN | BODY MASS INDEX: 34.66 KG/M2 | DIASTOLIC BLOOD PRESSURE: 77 MMHG | WEIGHT: 208 LBS

## 2020-11-30 DIAGNOSIS — R10.9 UNSPECIFIED ABDOMINAL PAIN: ICD-10-CM

## 2020-11-30 LAB
HCG UR QL: NEGATIVE
QUALITY CONTROL: YES

## 2020-11-30 PROCEDURE — 99072 ADDL SUPL MATRL&STAF TM PHE: CPT

## 2020-11-30 PROCEDURE — 58300 INSERT INTRAUTERINE DEVICE: CPT

## 2020-11-30 PROCEDURE — 76830 TRANSVAGINAL US NON-OB: CPT

## 2020-11-30 RX ADMIN — IBUPROFEN 1 MG: 600 TABLET ORAL at 00:00

## 2020-11-30 NOTE — PROCEDURE
[IUD Placement] : intrauterine device (IUD) placement [Time out performed] : Pre-procedure time out performed.  Patient's name, date of birth and procedure confirmed. [Consent Obtained] : Consent obtained [Risks] : risks [Benefits] : benefits [Alternatives] : alternatives [Patient] : patient [Infection] : infection [Bleeding] : bleeding [Pain] : pain [Expulsion] : expulsion [Failure] : failure [Uterine Perforation] : uterine perforation [Neg Pregnancy Test] : negative pregnancy test [Betadine] : Betadine [Tenaculum] : Tenaculum [Easy Passage] : Easy passage [Mirena IUD] : Mirena IUD [Tolerated Well] : Patient tolerated the procedure well [No Complications] : No complications [Motrin/Ibuprofen] : Motrin/Ibuprofen

## 2020-12-15 ENCOUNTER — APPOINTMENT (OUTPATIENT)
Dept: OBGYN | Facility: CLINIC | Age: 35
End: 2020-12-15
Payer: MEDICAID

## 2020-12-15 VITALS
TEMPERATURE: 98.3 F | BODY MASS INDEX: 48.82 KG/M2 | SYSTOLIC BLOOD PRESSURE: 109 MMHG | HEART RATE: 81 BPM | HEIGHT: 65 IN | DIASTOLIC BLOOD PRESSURE: 78 MMHG | WEIGHT: 293 LBS

## 2020-12-15 DIAGNOSIS — R82.90 UNSPECIFIED ABNORMAL FINDINGS IN URINE: ICD-10-CM

## 2020-12-15 PROCEDURE — 99213 OFFICE O/P EST LOW 20 MIN: CPT

## 2020-12-15 PROCEDURE — 99072 ADDL SUPL MATRL&STAF TM PHE: CPT

## 2020-12-18 LAB — BACTERIA UR CULT: NORMAL

## 2020-12-22 NOTE — PLAN
[FreeTextEntry1] : IUD string visualized on exam\par reassurance offered\par follow up prn or with annual

## 2020-12-22 NOTE — HISTORY OF PRESENT ILLNESS
[FreeTextEntry1] : patient is here because she does not feel her IUD string and is worried\par denies any pelvic pain, vaginal discharge or abnormal uterine bleeding

## 2021-02-22 ENCOUNTER — APPOINTMENT (OUTPATIENT)
Dept: OBGYN | Facility: CLINIC | Age: 36
End: 2021-02-22
Payer: MEDICAID

## 2021-02-22 VITALS
HEIGHT: 65 IN | BODY MASS INDEX: 35.07 KG/M2 | HEART RATE: 99 BPM | SYSTOLIC BLOOD PRESSURE: 120 MMHG | DIASTOLIC BLOOD PRESSURE: 77 MMHG | WEIGHT: 210.5 LBS | TEMPERATURE: 97.3 F

## 2021-02-22 DIAGNOSIS — Z01.419 ENCOUNTER FOR GYNECOLOGICAL EXAMINATION (GENERAL) (ROUTINE) W/OUT ABNORMAL FINDINGS: ICD-10-CM

## 2021-02-22 PROCEDURE — 99395 PREV VISIT EST AGE 18-39: CPT

## 2021-02-22 PROCEDURE — 99072 ADDL SUPL MATRL&STAF TM PHE: CPT

## 2021-02-24 LAB
C TRACH RRNA SPEC QL NAA+PROBE: NOT DETECTED
HBV SURFACE AG SER QL: NONREACTIVE
HCV AB SER QL: NONREACTIVE
HCV S/CO RATIO: 0.13 S/CO
HIV1+2 AB SPEC QL IA.RAPID: NONREACTIVE
HPV HIGH+LOW RISK DNA PNL CVX: NOT DETECTED
N GONORRHOEA RRNA SPEC QL NAA+PROBE: NOT DETECTED
SOURCE AMPLIFICATION: NORMAL
T PALLIDUM AB SER QL IA: NEGATIVE

## 2021-02-26 LAB — CYTOLOGY CVX/VAG DOC THIN PREP: NORMAL

## 2021-04-02 ENCOUNTER — TRANSCRIPTION ENCOUNTER (OUTPATIENT)
Age: 36
End: 2021-04-02

## 2021-04-06 ENCOUNTER — APPOINTMENT (OUTPATIENT)
Dept: DISASTER EMERGENCY | Facility: OTHER | Age: 36
End: 2021-04-06
Payer: MEDICAID

## 2021-04-06 PROCEDURE — 0001A: CPT

## 2021-04-27 ENCOUNTER — APPOINTMENT (OUTPATIENT)
Dept: DISASTER EMERGENCY | Facility: OTHER | Age: 36
End: 2021-04-27
Payer: MEDICAID

## 2021-04-27 PROCEDURE — 0002A: CPT

## 2021-08-13 ENCOUNTER — APPOINTMENT (OUTPATIENT)
Dept: OBGYN | Facility: CLINIC | Age: 36
End: 2021-08-13

## 2021-08-13 DIAGNOSIS — T83.32XA DISPLACEMENT OF INTRAUTERINE CONTRACEPTIVE DEVICE, INITIAL ENCOUNTER: ICD-10-CM

## 2021-08-16 ENCOUNTER — ASOB RESULT (OUTPATIENT)
Age: 36
End: 2021-08-16

## 2021-08-16 ENCOUNTER — APPOINTMENT (OUTPATIENT)
Dept: OBGYN | Facility: CLINIC | Age: 36
End: 2021-08-16
Payer: MEDICAID

## 2021-08-16 PROCEDURE — 76830 TRANSVAGINAL US NON-OB: CPT

## 2021-08-17 ENCOUNTER — APPOINTMENT (OUTPATIENT)
Dept: OBGYN | Facility: CLINIC | Age: 36
End: 2021-08-17
Payer: MEDICAID

## 2021-08-17 VITALS
BODY MASS INDEX: 33.66 KG/M2 | HEIGHT: 65 IN | HEART RATE: 83 BPM | WEIGHT: 202 LBS | DIASTOLIC BLOOD PRESSURE: 67 MMHG | SYSTOLIC BLOOD PRESSURE: 96 MMHG

## 2021-08-17 DIAGNOSIS — B37.3 CANDIDIASIS OF VULVA AND VAGINA: ICD-10-CM

## 2021-08-17 PROCEDURE — 99213 OFFICE O/P EST LOW 20 MIN: CPT

## 2021-12-17 ENCOUNTER — APPOINTMENT (OUTPATIENT)
Dept: OBGYN | Facility: CLINIC | Age: 36
End: 2021-12-17

## 2021-12-17 ENCOUNTER — APPOINTMENT (OUTPATIENT)
Dept: OBGYN | Facility: CLINIC | Age: 36
End: 2021-12-17
Payer: MEDICAID

## 2021-12-17 VITALS
DIASTOLIC BLOOD PRESSURE: 84 MMHG | WEIGHT: 202 LBS | HEIGHT: 60 IN | HEART RATE: 80 BPM | BODY MASS INDEX: 39.66 KG/M2 | SYSTOLIC BLOOD PRESSURE: 118 MMHG | TEMPERATURE: 97.2 F

## 2021-12-17 DIAGNOSIS — N76.6 ULCERATION OF VULVA: ICD-10-CM

## 2021-12-17 DIAGNOSIS — A60.00 HERPESVIRAL INFECTION OF UROGENITAL SYSTEM, UNSPECIFIED: ICD-10-CM

## 2021-12-17 PROCEDURE — 99214 OFFICE O/P EST MOD 30 MIN: CPT

## 2021-12-18 LAB
HBV SURFACE AG SER QL: NONREACTIVE
HCV AB SER QL: NONREACTIVE
HCV S/CO RATIO: 0.12 S/CO
HIV1+2 AB SPEC QL IA.RAPID: NONREACTIVE
HSV 1+2 IGG SER IA-IMP: POSITIVE
HSV 1+2 IGG SER IA-IMP: POSITIVE
HSV1 IGG SER QL: 46.5 INDEX
HSV2 IGG SER QL: 1.7 INDEX
T PALLIDUM AB SER QL IA: NEGATIVE

## 2021-12-22 ENCOUNTER — NON-APPOINTMENT (OUTPATIENT)
Age: 36
End: 2021-12-22

## 2021-12-22 LAB
HHV SPEC CULT: ABNORMAL
HSV TYPE 1: NORMAL
HSV TYPE 2: ABNORMAL

## 2021-12-24 LAB
HSV1 IGM SER QL: NEGATIVE
HSV2 AB FLD-ACNC: NEGATIVE

## 2022-01-29 NOTE — ED ADULT TRIAGE NOTE - RESPIRATORY RATE (BREATHS/MIN)
16 Physical Exam    Vital Signs: I have reviewed the initial vital signs.  Constitutional: well-nourished, appears stated age, no acute distress  Eyes: Conjunctiva pink, Sclera clear, PERRLA, EOMI, no ptosis, no entrapment, no racoon eyes.    Cardiovascular: S1 and S2, regular rate,   Respiratory: unlabored respiratory effort, speaking in full sentences, handling oral secretions,   Gastrointestinal: soft, non-tender abdomen, n  Musculoskeletal: supple neck, no lower extremity edema, no midline tenderness, paraspinal tenderness, no clavicular crepitus, painful rom, moving all extremities appropriately, no gross bony deformities or swelling.  Integumentary: warm, dry, no rashes, + 1 1cm lacerations of lower lip through and through to skin  Neurologic: awake, alert non ataxic gait

## 2022-02-15 ENCOUNTER — APPOINTMENT (OUTPATIENT)
Dept: OBGYN | Facility: CLINIC | Age: 37
End: 2022-02-15
Payer: MEDICAID

## 2022-02-15 VITALS
HEIGHT: 60 IN | HEART RATE: 75 BPM | BODY MASS INDEX: 38.87 KG/M2 | WEIGHT: 198 LBS | SYSTOLIC BLOOD PRESSURE: 121 MMHG | DIASTOLIC BLOOD PRESSURE: 82 MMHG

## 2022-02-15 DIAGNOSIS — A64 UNSPECIFIED SEXUALLY TRANSMITTED DISEASE: ICD-10-CM

## 2022-02-15 DIAGNOSIS — R30.0 DYSURIA: ICD-10-CM

## 2022-02-15 LAB — HIV1+2 AB SPEC QL IA.RAPID: NONREACTIVE

## 2022-02-15 PROCEDURE — 99213 OFFICE O/P EST LOW 20 MIN: CPT

## 2022-02-16 DIAGNOSIS — B96.89 ACUTE VAGINITIS: ICD-10-CM

## 2022-02-16 DIAGNOSIS — N76.0 ACUTE VAGINITIS: ICD-10-CM

## 2022-02-16 LAB
BACTERIA UR CULT: NORMAL
C TRACH RRNA SPEC QL NAA+PROBE: NOT DETECTED
CANDIDA VAG CYTO: NOT DETECTED
G VAGINALIS+PREV SP MTYP VAG QL MICRO: DETECTED
HBV SURFACE AG SER QL: NONREACTIVE
HCV AB SER QL: NONREACTIVE
HCV S/CO RATIO: 0.09 S/CO
N GONORRHOEA RRNA SPEC QL NAA+PROBE: NOT DETECTED
SOURCE AMPLIFICATION: NORMAL
T PALLIDUM AB SER QL IA: NEGATIVE
T VAGINALIS VAG QL WET PREP: NOT DETECTED

## 2022-03-08 ENCOUNTER — NON-APPOINTMENT (OUTPATIENT)
Age: 37
End: 2022-03-08

## 2022-03-10 ENCOUNTER — NON-APPOINTMENT (OUTPATIENT)
Age: 37
End: 2022-03-10

## 2022-03-16 ENCOUNTER — NON-APPOINTMENT (OUTPATIENT)
Age: 37
End: 2022-03-16

## 2022-07-03 ENCOUNTER — EMERGENCY (EMERGENCY)
Facility: HOSPITAL | Age: 37
LOS: 1 days | Discharge: HOME | End: 2022-07-03
Attending: EMERGENCY MEDICINE | Admitting: STUDENT IN AN ORGANIZED HEALTH CARE EDUCATION/TRAINING PROGRAM

## 2022-07-03 VITALS
HEART RATE: 104 BPM | TEMPERATURE: 98 F | OXYGEN SATURATION: 100 % | DIASTOLIC BLOOD PRESSURE: 78 MMHG | SYSTOLIC BLOOD PRESSURE: 118 MMHG | RESPIRATION RATE: 18 BRPM

## 2022-07-03 LAB
A1C WITH ESTIMATED AVERAGE GLUCOSE RESULT: 8.8 % — HIGH (ref 4–5.6)
ALBUMIN SERPL ELPH-MCNC: 3.9 G/DL — SIGNIFICANT CHANGE UP (ref 3.3–5)
ALP SERPL-CCNC: 82 U/L — SIGNIFICANT CHANGE UP (ref 40–120)
ALT FLD-CCNC: 11 U/L — SIGNIFICANT CHANGE UP (ref 4–33)
ANION GAP SERPL CALC-SCNC: 11 MMOL/L — SIGNIFICANT CHANGE UP (ref 7–14)
APPEARANCE UR: ABNORMAL
AST SERPL-CCNC: 10 U/L — SIGNIFICANT CHANGE UP (ref 4–32)
B PERT DNA SPEC QL NAA+PROBE: SIGNIFICANT CHANGE UP
B PERT+PARAPERT DNA PNL SPEC NAA+PROBE: SIGNIFICANT CHANGE UP
BACTERIA # UR AUTO: ABNORMAL
BASOPHILS # BLD AUTO: 0.02 K/UL — SIGNIFICANT CHANGE UP (ref 0–0.2)
BASOPHILS NFR BLD AUTO: 0.2 % — SIGNIFICANT CHANGE UP (ref 0–2)
BILIRUB SERPL-MCNC: 0.5 MG/DL — SIGNIFICANT CHANGE UP (ref 0.2–1.2)
BILIRUB UR-MCNC: NEGATIVE — SIGNIFICANT CHANGE UP
BORDETELLA PARAPERTUSSIS (RAPRVP): SIGNIFICANT CHANGE UP
BUN SERPL-MCNC: 5 MG/DL — LOW (ref 7–23)
C PNEUM DNA SPEC QL NAA+PROBE: SIGNIFICANT CHANGE UP
CALCIUM SERPL-MCNC: 9.6 MG/DL — SIGNIFICANT CHANGE UP (ref 8.4–10.5)
CHLORIDE SERPL-SCNC: 100 MMOL/L — SIGNIFICANT CHANGE UP (ref 98–107)
CO2 SERPL-SCNC: 26 MMOL/L — SIGNIFICANT CHANGE UP (ref 22–31)
COLOR SPEC: YELLOW — SIGNIFICANT CHANGE UP
COMMENT - URINE: SIGNIFICANT CHANGE UP
CREAT SERPL-MCNC: 0.69 MG/DL — SIGNIFICANT CHANGE UP (ref 0.5–1.3)
DIFF PNL FLD: ABNORMAL
EGFR: 115 ML/MIN/1.73M2 — SIGNIFICANT CHANGE UP
EOSINOPHIL # BLD AUTO: 0.07 K/UL — SIGNIFICANT CHANGE UP (ref 0–0.5)
EOSINOPHIL NFR BLD AUTO: 0.7 % — SIGNIFICANT CHANGE UP (ref 0–6)
EPI CELLS # UR: 1 /HPF — SIGNIFICANT CHANGE UP (ref 0–5)
ESTIMATED AVERAGE GLUCOSE: 206 — SIGNIFICANT CHANGE UP
FLUAV SUBTYP SPEC NAA+PROBE: SIGNIFICANT CHANGE UP
FLUBV RNA SPEC QL NAA+PROBE: SIGNIFICANT CHANGE UP
GLUCOSE SERPL-MCNC: 194 MG/DL — HIGH (ref 70–99)
GLUCOSE UR QL: NEGATIVE — SIGNIFICANT CHANGE UP
HADV DNA SPEC QL NAA+PROBE: SIGNIFICANT CHANGE UP
HCG UR QL: NEGATIVE — SIGNIFICANT CHANGE UP
HCOV 229E RNA SPEC QL NAA+PROBE: SIGNIFICANT CHANGE UP
HCOV HKU1 RNA SPEC QL NAA+PROBE: SIGNIFICANT CHANGE UP
HCOV NL63 RNA SPEC QL NAA+PROBE: SIGNIFICANT CHANGE UP
HCOV OC43 RNA SPEC QL NAA+PROBE: SIGNIFICANT CHANGE UP
HCT VFR BLD CALC: 41.5 % — SIGNIFICANT CHANGE UP (ref 34.5–45)
HGB BLD-MCNC: 12.9 G/DL — SIGNIFICANT CHANGE UP (ref 11.5–15.5)
HIV 1+2 AB+HIV1 P24 AG SERPL QL IA: SIGNIFICANT CHANGE UP
HMPV RNA SPEC QL NAA+PROBE: SIGNIFICANT CHANGE UP
HPIV1 RNA SPEC QL NAA+PROBE: SIGNIFICANT CHANGE UP
HPIV2 RNA SPEC QL NAA+PROBE: SIGNIFICANT CHANGE UP
HPIV3 RNA SPEC QL NAA+PROBE: SIGNIFICANT CHANGE UP
HPIV4 RNA SPEC QL NAA+PROBE: SIGNIFICANT CHANGE UP
HYALINE CASTS # UR AUTO: 0 /LPF — SIGNIFICANT CHANGE UP (ref 0–7)
IANC: 6.19 K/UL — SIGNIFICANT CHANGE UP (ref 1.8–7.4)
IMM GRANULOCYTES NFR BLD AUTO: 0.3 % — SIGNIFICANT CHANGE UP (ref 0–1.5)
KETONES UR-MCNC: ABNORMAL
LEUKOCYTE ESTERASE UR-ACNC: ABNORMAL
LYMPHOCYTES # BLD AUTO: 2.73 K/UL — SIGNIFICANT CHANGE UP (ref 1–3.3)
LYMPHOCYTES # BLD AUTO: 27.3 % — SIGNIFICANT CHANGE UP (ref 13–44)
M PNEUMO DNA SPEC QL NAA+PROBE: SIGNIFICANT CHANGE UP
MAGNESIUM SERPL-MCNC: 1.8 MG/DL — SIGNIFICANT CHANGE UP (ref 1.6–2.6)
MCHC RBC-ENTMCNC: 29.5 PG — SIGNIFICANT CHANGE UP (ref 27–34)
MCHC RBC-ENTMCNC: 31.1 GM/DL — LOW (ref 32–36)
MCV RBC AUTO: 94.7 FL — SIGNIFICANT CHANGE UP (ref 80–100)
MONOCYTES # BLD AUTO: 0.97 K/UL — HIGH (ref 0–0.9)
MONOCYTES NFR BLD AUTO: 9.7 % — SIGNIFICANT CHANGE UP (ref 2–14)
NEUTROPHILS # BLD AUTO: 6.19 K/UL — SIGNIFICANT CHANGE UP (ref 1.8–7.4)
NEUTROPHILS NFR BLD AUTO: 61.8 % — SIGNIFICANT CHANGE UP (ref 43–77)
NITRITE UR-MCNC: POSITIVE
NRBC # BLD: 0 /100 WBCS — SIGNIFICANT CHANGE UP
NRBC # FLD: 0 K/UL — SIGNIFICANT CHANGE UP
PH UR: 6.5 — SIGNIFICANT CHANGE UP (ref 5–8)
PLATELET # BLD AUTO: 244 K/UL — SIGNIFICANT CHANGE UP (ref 150–400)
POTASSIUM SERPL-MCNC: 4.4 MMOL/L — SIGNIFICANT CHANGE UP (ref 3.5–5.3)
POTASSIUM SERPL-SCNC: 4.4 MMOL/L — SIGNIFICANT CHANGE UP (ref 3.5–5.3)
PROT SERPL-MCNC: 8.3 G/DL — SIGNIFICANT CHANGE UP (ref 6–8.3)
PROT UR-MCNC: ABNORMAL
RAPID RVP RESULT: SIGNIFICANT CHANGE UP
RBC # BLD: 4.38 M/UL — SIGNIFICANT CHANGE UP (ref 3.8–5.2)
RBC # FLD: 12.1 % — SIGNIFICANT CHANGE UP (ref 10.3–14.5)
RBC CASTS # UR COMP ASSIST: SIGNIFICANT CHANGE UP /HPF (ref 0–4)
RSV RNA SPEC QL NAA+PROBE: SIGNIFICANT CHANGE UP
RV+EV RNA SPEC QL NAA+PROBE: SIGNIFICANT CHANGE UP
SARS-COV-2 RNA SPEC QL NAA+PROBE: SIGNIFICANT CHANGE UP
SODIUM SERPL-SCNC: 137 MMOL/L — SIGNIFICANT CHANGE UP (ref 135–145)
SP GR SPEC: 1.02 — SIGNIFICANT CHANGE UP (ref 1–1.05)
UROBILINOGEN FLD QL: SIGNIFICANT CHANGE UP
WBC # BLD: 10.01 K/UL — SIGNIFICANT CHANGE UP (ref 3.8–10.5)
WBC # FLD AUTO: 10.01 K/UL — SIGNIFICANT CHANGE UP (ref 3.8–10.5)
WBC UR QL: >50 /HPF — SIGNIFICANT CHANGE UP (ref 0–5)

## 2022-07-03 PROCEDURE — 71046 X-RAY EXAM CHEST 2 VIEWS: CPT | Mod: 26

## 2022-07-03 PROCEDURE — 99220: CPT

## 2022-07-03 RX ORDER — ACETAMINOPHEN 500 MG
975 TABLET ORAL ONCE
Refills: 0 | Status: COMPLETED | OUTPATIENT
Start: 2022-07-03 | End: 2022-07-03

## 2022-07-03 RX ORDER — SODIUM CHLORIDE 9 MG/ML
1000 INJECTION, SOLUTION INTRAVENOUS ONCE
Refills: 0 | Status: COMPLETED | OUTPATIENT
Start: 2022-07-03 | End: 2022-07-03

## 2022-07-03 RX ORDER — CEFTRIAXONE 500 MG/1
1000 INJECTION, POWDER, FOR SOLUTION INTRAMUSCULAR; INTRAVENOUS ONCE
Refills: 0 | Status: COMPLETED | OUTPATIENT
Start: 2022-07-03 | End: 2022-07-03

## 2022-07-03 RX ADMIN — Medication 975 MILLIGRAM(S): at 21:58

## 2022-07-03 RX ADMIN — SODIUM CHLORIDE 1000 MILLILITER(S): 9 INJECTION, SOLUTION INTRAVENOUS at 21:57

## 2022-07-03 RX ADMIN — CEFTRIAXONE 100 MILLIGRAM(S): 500 INJECTION, POWDER, FOR SOLUTION INTRAMUSCULAR; INTRAVENOUS at 23:53

## 2022-07-03 NOTE — ED CDU PROVIDER INITIAL DAY NOTE - ATTENDING APP SHARED VISIT CONTRIBUTION OF CARE
latrell 38 yo dm woman here with 3 preceding days of dysuria, today felt achy, headache, still dysuria.  here with rectal temp 101.  ua pos, urine culture sent, antibiotics started, will have go to obs.  pt with pyelo    I performed a history and physical exam of the patient and discussed their management with the resident and /or advanced care provider. I reviewed the resident and /or ACP's note and agree with the documented findings and plan of care. My medical decison making and observations are found above.

## 2022-07-03 NOTE — ED PROVIDER NOTE - NEUROLOGICAL, MLM
Alert and oriented, no focal deficits, no motor; subjective tingling in b/l LE, but no objective sensory deficits

## 2022-07-03 NOTE — ED PROVIDER NOTE - OBJECTIVE STATEMENT
Ms. Grove is a 37F with h/o T2DM (on Metformin 1000 QHS) who presents with chills, body aches, and headaches for several days. She says that she may have had a recent COVID-19 exposure at her job as a . Her headache started > 24h ago and she first noted it after taking a nap.    She also reports "UTI symptoms like burning" for several days.

## 2022-07-03 NOTE — ED ADULT NURSE NOTE - OBJECTIVE STATEMENT
Pt received in INT8. C/o chills, body aches, cough, headache, nausea no vomiting, vag pain. States her boss was positive for COVID, came back to work and was still coughing a lot. PMH DM2. A&Ox4, ambulatory at baseline. Breathing even and unlabored. Febrile to 101.3 rectally. Pt denies SOB, chest pain, dizziness. 20G IV placed on left AC. Labs drawn and sent. Pt medicated per MAR. Pt on IVF. Waiting for XR.

## 2022-07-03 NOTE — ED PROVIDER NOTE - ATTENDING CONTRIBUTION TO CARE
latrell 36 yo dm woman here with 3 preceding days of dysuria, today felt achy, headache, still dysuria.  here with rectal temp 101.  ua pos, urine culture sent, antibiotics started, will have go to obs.  pt with pyelo    I performed a history and physical exam of the patient and discussed their management with the resident and /or advanced care provider. I reviewed the resident and /or ACP's note and agree with the documented findings and plan of care. My medical decison making and observations are found above.

## 2022-07-03 NOTE — ED PROVIDER NOTE - CLINICAL SUMMARY MEDICAL DECISION MAKING FREE TEXT BOX
Ms. Grove is a 37F with h/o Genital Herpes (no meds), T2DM who presents with generalized body aches, headache, intermittent, non-productive cough c/f viral syndrome. She is also however reporting urinary burning c/f UTI incl ascending UTI.   - Labs, UA+UCx+Upreg  - CXR, ECG  - RVP  - APAP for pain control, LR Ms. Grove is a 37F with h/o Genital Herpes (no meds), T2DM who presents with generalized body aches, headache, intermittent, non-productive cough c/f viral syndrome. She is also however reporting urinary burning c/f UTI incl ascending UTI.   - Labs, UA+UCx+Upreg  - CXR, ECG  - RVP  - APAP for pain control, JACKI sams 38 yo dm woman here with 3 preceding days of dysuria, today felt achy, headache, still dysuria.  here with rectal temp 101.  ua pos, urine culture sent, antibiotics started, will have go to obs.  pt with pyelo

## 2022-07-03 NOTE — ED CDU PROVIDER INITIAL DAY NOTE - MEDICAL DECISION MAKING DETAILS
IV antibiotics, IV hydration, monitor vitals, supportive care, AM labs, general observation care / monitoring.

## 2022-07-03 NOTE — ED CDU PROVIDER INITIAL DAY NOTE - OBJECTIVE STATEMENT
Ms. Grove is a 37F with h/o T2DM (on Metformin 1000 QHS) who presents with chills, body aches, and headaches for several days. She says that she may have had a recent COVID-19 exposure at her job as a . Her headache started > 24h ago and she first noted it after taking a nap.  She also reports "UTI symptoms like burning" for several days.    CDU BUBBA Hernandez Note-----  ED Provider HPI as above, reviewed.  Pt. is a 38 yo female, PMH DM (on metformin), presented to the ED c/o dysuria, chills, myalgias, intermittent headaches x past few days, also gave hx/o possible COVID exposure at her job.  In the ED, initial vitals were stable, pt initially afebrile, .  Later, pt developed temp 101.3 rectally; VS otherwise stable.  WBC 6.19, CMP: glucose 194; CMP otherwise unremarkable; HbA1c 8.8; UA +large leukocytes / +nitrite.  RVP/COVID swab unremarkable.  CXR with no acute findings.  Pt. was treated for pyelonephritis with IV ceftriaxone and IV fluids; pt was dispo'd to CDU for continuing care plan:  IV antibiotics, IV hydration, monitor vitals, supportive care, AM labs, general observation care / monitoring.

## 2022-07-04 VITALS
HEART RATE: 73 BPM | DIASTOLIC BLOOD PRESSURE: 75 MMHG | SYSTOLIC BLOOD PRESSURE: 120 MMHG | RESPIRATION RATE: 20 BRPM | TEMPERATURE: 98 F | OXYGEN SATURATION: 100 %

## 2022-07-04 LAB
ANION GAP SERPL CALC-SCNC: 13 MMOL/L — SIGNIFICANT CHANGE UP (ref 7–14)
BASE EXCESS BLDV CALC-SCNC: -1.7 MMOL/L — SIGNIFICANT CHANGE UP (ref -2–3)
BASE EXCESS BLDV CALC-SCNC: -1.9 MMOL/L — SIGNIFICANT CHANGE UP (ref -2–3)
BASOPHILS # BLD AUTO: 0.02 K/UL — SIGNIFICANT CHANGE UP (ref 0–0.2)
BASOPHILS NFR BLD AUTO: 0.3 % — SIGNIFICANT CHANGE UP (ref 0–2)
BLOOD GAS VENOUS COMPREHENSIVE RESULT: SIGNIFICANT CHANGE UP
BLOOD GAS VENOUS COMPREHENSIVE RESULT: SIGNIFICANT CHANGE UP
BUN SERPL-MCNC: 8 MG/DL — SIGNIFICANT CHANGE UP (ref 7–23)
CALCIUM SERPL-MCNC: 8.9 MG/DL — SIGNIFICANT CHANGE UP (ref 8.4–10.5)
CHLORIDE BLDV-SCNC: 101 MMOL/L — SIGNIFICANT CHANGE UP (ref 96–108)
CHLORIDE BLDV-SCNC: 107 MMOL/L — SIGNIFICANT CHANGE UP (ref 96–108)
CHLORIDE SERPL-SCNC: 101 MMOL/L — SIGNIFICANT CHANGE UP (ref 98–107)
CO2 BLDV-SCNC: 24.2 MMOL/L — SIGNIFICANT CHANGE UP (ref 22–26)
CO2 BLDV-SCNC: 25.7 MMOL/L — SIGNIFICANT CHANGE UP (ref 22–26)
CO2 SERPL-SCNC: 23 MMOL/L — SIGNIFICANT CHANGE UP (ref 22–31)
CREAT SERPL-MCNC: 0.68 MG/DL — SIGNIFICANT CHANGE UP (ref 0.5–1.3)
EGFR: 115 ML/MIN/1.73M2 — SIGNIFICANT CHANGE UP
EOSINOPHIL # BLD AUTO: 0.14 K/UL — SIGNIFICANT CHANGE UP (ref 0–0.5)
EOSINOPHIL NFR BLD AUTO: 2.1 % — SIGNIFICANT CHANGE UP (ref 0–6)
GAS PNL BLDV: 134 MMOL/L — LOW (ref 136–145)
GAS PNL BLDV: 134 MMOL/L — LOW (ref 136–145)
GLUCOSE BLDV-MCNC: 241 MG/DL — HIGH (ref 70–99)
GLUCOSE BLDV-MCNC: 321 MG/DL — HIGH (ref 70–99)
GLUCOSE SERPL-MCNC: 314 MG/DL — HIGH (ref 70–99)
HCO3 BLDV-SCNC: 23 MMOL/L — SIGNIFICANT CHANGE UP (ref 22–29)
HCO3 BLDV-SCNC: 24 MMOL/L — SIGNIFICANT CHANGE UP (ref 22–29)
HCT VFR BLD CALC: 36.7 % — SIGNIFICANT CHANGE UP (ref 34.5–45)
HCT VFR BLDA CALC: 34 % — LOW (ref 34.5–46.5)
HCT VFR BLDA CALC: 37 % — SIGNIFICANT CHANGE UP (ref 34.5–46.5)
HGB BLD CALC-MCNC: 11.3 G/DL — LOW (ref 11.5–15.5)
HGB BLD CALC-MCNC: 12.3 G/DL — SIGNIFICANT CHANGE UP (ref 11.5–15.5)
HGB BLD-MCNC: 11.8 G/DL — SIGNIFICANT CHANGE UP (ref 11.5–15.5)
IANC: 3.18 K/UL — SIGNIFICANT CHANGE UP (ref 1.8–7.4)
IMM GRANULOCYTES NFR BLD AUTO: 0.3 % — SIGNIFICANT CHANGE UP (ref 0–1.5)
LACTATE BLDV-MCNC: 1.3 MMOL/L — SIGNIFICANT CHANGE UP (ref 0.5–2)
LACTATE BLDV-MCNC: 3.2 MMOL/L — HIGH (ref 0.5–2)
LYMPHOCYTES # BLD AUTO: 2.56 K/UL — SIGNIFICANT CHANGE UP (ref 1–3.3)
LYMPHOCYTES # BLD AUTO: 37.7 % — SIGNIFICANT CHANGE UP (ref 13–44)
MCHC RBC-ENTMCNC: 29.9 PG — SIGNIFICANT CHANGE UP (ref 27–34)
MCHC RBC-ENTMCNC: 32.2 GM/DL — SIGNIFICANT CHANGE UP (ref 32–36)
MCV RBC AUTO: 93.1 FL — SIGNIFICANT CHANGE UP (ref 80–100)
MONOCYTES # BLD AUTO: 0.87 K/UL — SIGNIFICANT CHANGE UP (ref 0–0.9)
MONOCYTES NFR BLD AUTO: 12.8 % — SIGNIFICANT CHANGE UP (ref 2–14)
NEUTROPHILS # BLD AUTO: 3.18 K/UL — SIGNIFICANT CHANGE UP (ref 1.8–7.4)
NEUTROPHILS NFR BLD AUTO: 46.8 % — SIGNIFICANT CHANGE UP (ref 43–77)
NRBC # BLD: 0 /100 WBCS — SIGNIFICANT CHANGE UP
NRBC # FLD: 0 K/UL — SIGNIFICANT CHANGE UP
PCO2 BLDV: 38 MMHG — LOW (ref 39–42)
PCO2 BLDV: 46 MMHG — HIGH (ref 39–42)
PH BLDV: 7.33 — SIGNIFICANT CHANGE UP (ref 7.32–7.43)
PH BLDV: 7.39 — SIGNIFICANT CHANGE UP (ref 7.32–7.43)
PLATELET # BLD AUTO: 201 K/UL — SIGNIFICANT CHANGE UP (ref 150–400)
PO2 BLDV: 127 MMHG — SIGNIFICANT CHANGE UP
PO2 BLDV: 99 MMHG — SIGNIFICANT CHANGE UP
POTASSIUM BLDV-SCNC: 3.8 MMOL/L — SIGNIFICANT CHANGE UP (ref 3.5–5.1)
POTASSIUM BLDV-SCNC: 3.9 MMOL/L — SIGNIFICANT CHANGE UP (ref 3.5–5.1)
POTASSIUM SERPL-MCNC: 3.9 MMOL/L — SIGNIFICANT CHANGE UP (ref 3.5–5.3)
POTASSIUM SERPL-SCNC: 3.9 MMOL/L — SIGNIFICANT CHANGE UP (ref 3.5–5.3)
RBC # BLD: 3.94 M/UL — SIGNIFICANT CHANGE UP (ref 3.8–5.2)
RBC # FLD: 12.2 % — SIGNIFICANT CHANGE UP (ref 10.3–14.5)
SAO2 % BLDV: 97.7 % — SIGNIFICANT CHANGE UP
SAO2 % BLDV: 99.5 % — SIGNIFICANT CHANGE UP
SODIUM SERPL-SCNC: 137 MMOL/L — SIGNIFICANT CHANGE UP (ref 135–145)
T PALLIDUM AB TITR SER: NEGATIVE — SIGNIFICANT CHANGE UP
WBC # BLD: 6.79 K/UL — SIGNIFICANT CHANGE UP (ref 3.8–10.5)
WBC # FLD AUTO: 6.79 K/UL — SIGNIFICANT CHANGE UP (ref 3.8–10.5)

## 2022-07-04 PROCEDURE — 99217: CPT

## 2022-07-04 PROCEDURE — 76770 US EXAM ABDO BACK WALL COMP: CPT | Mod: 26

## 2022-07-04 RX ORDER — INSULIN LISPRO 100/ML
VIAL (ML) SUBCUTANEOUS AT BEDTIME
Refills: 0 | Status: DISCONTINUED | OUTPATIENT
Start: 2022-07-04 | End: 2022-07-07

## 2022-07-04 RX ORDER — DEXTROSE 50 % IN WATER 50 %
12.5 SYRINGE (ML) INTRAVENOUS ONCE
Refills: 0 | Status: DISCONTINUED | OUTPATIENT
Start: 2022-07-04 | End: 2022-07-07

## 2022-07-04 RX ORDER — METFORMIN HYDROCHLORIDE 850 MG/1
0 TABLET ORAL
Qty: 0 | Refills: 0 | DISCHARGE

## 2022-07-04 RX ORDER — SODIUM CHLORIDE 9 MG/ML
1000 INJECTION, SOLUTION INTRAVENOUS
Refills: 0 | Status: DISCONTINUED | OUTPATIENT
Start: 2022-07-04 | End: 2022-07-07

## 2022-07-04 RX ORDER — FLUCONAZOLE 150 MG/1
150 TABLET ORAL ONCE
Refills: 0 | Status: COMPLETED | OUTPATIENT
Start: 2022-07-04 | End: 2022-07-04

## 2022-07-04 RX ORDER — CEFTRIAXONE 500 MG/1
1000 INJECTION, POWDER, FOR SOLUTION INTRAMUSCULAR; INTRAVENOUS ONCE
Refills: 0 | Status: COMPLETED | OUTPATIENT
Start: 2022-07-04 | End: 2022-07-04

## 2022-07-04 RX ORDER — KETOROLAC TROMETHAMINE 30 MG/ML
30 SYRINGE (ML) INJECTION EVERY 6 HOURS
Refills: 0 | Status: DISCONTINUED | OUTPATIENT
Start: 2022-07-04 | End: 2022-07-04

## 2022-07-04 RX ORDER — DEXTROSE 50 % IN WATER 50 %
25 SYRINGE (ML) INTRAVENOUS ONCE
Refills: 0 | Status: DISCONTINUED | OUTPATIENT
Start: 2022-07-04 | End: 2022-07-07

## 2022-07-04 RX ORDER — DEXTROSE 50 % IN WATER 50 %
15 SYRINGE (ML) INTRAVENOUS ONCE
Refills: 0 | Status: DISCONTINUED | OUTPATIENT
Start: 2022-07-04 | End: 2022-07-07

## 2022-07-04 RX ORDER — AZTREONAM 2 G
1 VIAL (EA) INJECTION
Qty: 28 | Refills: 0
Start: 2022-07-04 | End: 2022-07-17

## 2022-07-04 RX ORDER — FLUCONAZOLE 150 MG/1
1 TABLET ORAL
Qty: 2 | Refills: 0
Start: 2022-07-04 | End: 2022-07-05

## 2022-07-04 RX ORDER — GLUCAGON INJECTION, SOLUTION 0.5 MG/.1ML
1 INJECTION, SOLUTION SUBCUTANEOUS ONCE
Refills: 0 | Status: DISCONTINUED | OUTPATIENT
Start: 2022-07-04 | End: 2022-07-07

## 2022-07-04 RX ORDER — CEFTRIAXONE 500 MG/1
1000 INJECTION, POWDER, FOR SOLUTION INTRAMUSCULAR; INTRAVENOUS EVERY 24 HOURS
Refills: 0 | Status: DISCONTINUED | OUTPATIENT
Start: 2022-07-04 | End: 2022-07-04

## 2022-07-04 RX ORDER — SODIUM CHLORIDE 9 MG/ML
1000 INJECTION INTRAMUSCULAR; INTRAVENOUS; SUBCUTANEOUS ONCE
Refills: 0 | Status: COMPLETED | OUTPATIENT
Start: 2022-07-04 | End: 2022-07-04

## 2022-07-04 RX ORDER — SODIUM CHLORIDE 9 MG/ML
1000 INJECTION INTRAMUSCULAR; INTRAVENOUS; SUBCUTANEOUS
Refills: 0 | Status: DISCONTINUED | OUTPATIENT
Start: 2022-07-04 | End: 2022-07-07

## 2022-07-04 RX ORDER — INSULIN LISPRO 100/ML
VIAL (ML) SUBCUTANEOUS
Refills: 0 | Status: DISCONTINUED | OUTPATIENT
Start: 2022-07-04 | End: 2022-07-07

## 2022-07-04 RX ADMIN — FLUCONAZOLE 150 MILLIGRAM(S): 150 TABLET ORAL at 10:22

## 2022-07-04 RX ADMIN — SODIUM CHLORIDE 150 MILLILITER(S): 9 INJECTION INTRAMUSCULAR; INTRAVENOUS; SUBCUTANEOUS at 02:25

## 2022-07-04 RX ADMIN — Medication 30 MILLIGRAM(S): at 02:45

## 2022-07-04 RX ADMIN — Medication 1: at 12:18

## 2022-07-04 RX ADMIN — CEFTRIAXONE 100 MILLIGRAM(S): 500 INJECTION, POWDER, FOR SOLUTION INTRAMUSCULAR; INTRAVENOUS at 21:15

## 2022-07-04 RX ADMIN — Medication 30 MILLIGRAM(S): at 12:40

## 2022-07-04 RX ADMIN — SODIUM CHLORIDE 1000 MILLILITER(S): 9 INJECTION INTRAMUSCULAR; INTRAVENOUS; SUBCUTANEOUS at 10:22

## 2022-07-04 RX ADMIN — Medication 2: at 18:38

## 2022-07-04 RX ADMIN — SODIUM CHLORIDE 150 MILLILITER(S): 9 INJECTION INTRAMUSCULAR; INTRAVENOUS; SUBCUTANEOUS at 02:45

## 2022-07-04 RX ADMIN — Medication 3: at 08:36

## 2022-07-04 RX ADMIN — SODIUM CHLORIDE 150 MILLILITER(S): 9 INJECTION INTRAMUSCULAR; INTRAVENOUS; SUBCUTANEOUS at 12:17

## 2022-07-04 RX ADMIN — Medication 30 MILLIGRAM(S): at 12:18

## 2022-07-04 NOTE — ED CDU PROVIDER SUBSEQUENT DAY NOTE - MEDICAL DECISION MAKING DETAILS
36 yo female, PMH DM (on metformin), presented to the ED c/o dysuria, chills, myalgias, intermittent headaches x past few days.  In ED noted to have UA +large leukocytes / +nitrite.  RVP/COVID swab unremarkable.  CXR with no acute findings.  Pt. was treated for pyelonephritis with IV ceftriaxone and IV fluids; pt was dispo'd to CDU for continuing care plan:  IV antibiotics, IV hydration, monitor vitals, supportive care, AM labs, general observation care / monitoring.  This AM patient well appearing, c/o mild left flank pain.  AM labs revealed lactate 3.2, bolus being given, blood cultures sent and will repeat lactate prior to discharge.

## 2022-07-04 NOTE — ED CDU PROVIDER DISPOSITION NOTE - ATTENDING CONTRIBUTION TO CARE
38 yo F hx DM2 on Metformin presenting with fevers and urinary symptoms. Admitted to CDU for IV abx. Pt with lactate 3.2, RVP & CXR negative. Sx improved. Many yeast seen in UA- treated with 1 dose fluconazole. Well appearing. Plan to repeat lactate s/p 1L IVF. Lactate improved to 1.3. DC with return recs

## 2022-07-04 NOTE — ED CDU PROVIDER DISPOSITION NOTE - PATIENT PORTAL LINK FT
You can access the FollowMyHealth Patient Portal offered by [facility] by registering at the following website: [email or web address]. By joining [employer]’s FollowMyHealth portal, you will also be able to view your health information using other applications (apps) compatible with our system.

## 2022-07-04 NOTE — ED CDU PROVIDER SUBSEQUENT DAY NOTE - PROGRESS NOTE DETAILS
Pt was reassessed, feels better, will D/C, afebrile, requests Monistat, states she has frequent yeast infections with abx use.

## 2022-07-04 NOTE — ED CDU PROVIDER DISPOSITION NOTE - NS ED ATTENDING STATEMENT MOD
This was a shared visit with the BRANDON. I reviewed and verified the documentation and independently performed the documented: Attending with

## 2022-07-04 NOTE — ED CDU PROVIDER DISPOSITION NOTE - NSFOLLOWUPINSTRUCTIONS_ED_ALL_ED_FT
A prescription was sent to your pharmacy. Please,  and take as directed until finished.     Pyelonephritis, Adult       Pyelonephritis is an infection that occurs in the kidney. The kidneys are organs that help clean the blood by moving waste out of the blood and into the pee (urine). This infection can happen quickly, or it can last for a long time. In most cases, it clears up with treatment and does not cause other problems.      What are the causes?    This condition may be caused by:  •Germs (bacteria) going from the bladder up to the kidney. This may happen after having a bladder infection.      •Germs going from the blood to the kidney.        What increases the risk?    This condition is more likely to develop in:  •Pregnant women.       •Older people.     •People who have any of these conditions:   •Diabetes.      •Inflammation of the prostate gland (prostatitis), in males.      •Kidney stones or bladder stones.       •Other problems with the kidney or the parts of your body that carry pee from the kidneys to the bladder (ureters).      •Cancer.         •People who have a small, thin tube (catheter) placed in the bladder.      •People who are sexually active.       •Women who use a medicine that kills sperm (spermicide) to prevent pregnancy.      •People who have had a prior urinary tract infection (UTI).        What are the signs or symptoms?    Symptoms of this condition include:  •Peeing often.      •A strong urge to pee right away.      •Burning or stinging when peeing.      •Belly pain.      •Back pain.       •Pain in the side (flank area).      •Fever or chills.       •Blood in the pee, or dark pee.      •Feeling sick to your stomach (nauseous) or throwing up (vomiting).        How is this treated?    This condition may be treated by:  •Taking antibiotic medicines by mouth (orally).      •Drinking enough fluids.      If the infection is bad, you may need to stay in the hospital. You may be given antibiotics and fluids that are put directly into a vein through an IV tube.    In some cases, other treatments may be needed.      Follow these instructions at home:    Medicines     •Take your antibiotic medicine as told by your doctor. Do not stop taking the antibiotic even if you start to feel better.      •Take over-the-counter and prescription medicines only as told by your doctor.        General instructions      •Drink enough fluid to keep your pee pale yellow.      •Avoid caffeine, tea, and carbonated drinks.      •Pee (urinate) often. Avoid holding in pee for long periods of time.      •Pee before and after sex.      •After pooping (having a bowel movement), women should wipe from front to back. Use each tissue only once.      •Keep all follow-up visits as told by your doctor. This is important.        Contact a doctor if:    •You do not feel better after 2 days.      •Your symptoms get worse.      •You have a fever.        Get help right away if:    •You cannot take your medicine or drink fluids as told.      •You have chills and shaking.      •You throw up.      •You have very bad pain in your side or back.      •You feel very weak or you pass out (faint).        Summary    •Pyelonephritis is an infection that occurs in the kidney.      •In most cases, this infection clears up with treatment and does not cause other problems.      •Take your antibiotic medicine as told by your doctor. Do not stop taking the antibiotic even if you start to feel better.      •Drink enough fluid to keep your pee pale yellow.      This information is not intended to replace advice given to you by your health care provider. Make sure you discuss any questions you have with your health care provider.

## 2022-07-04 NOTE — ED CDU PROVIDER SUBSEQUENT DAY NOTE - ATTENDING APP SHARED VISIT CONTRIBUTION OF CARE
36 yo F hx DM2 on Metformin presenting with fevers and urinary symptoms. Admitted to CDU for IV abx. Pt with lactate 3.2, RVP & CXR negative. Sx improved. Many yeast seen in UA- treated with 1 dose fluconazole. Well appearing. Plan to repeat lactate s/p 1L IVF. Likely dc with pcp follow up and return recs.

## 2022-07-05 LAB
C TRACH RRNA SPEC QL NAA+PROBE: SIGNIFICANT CHANGE UP
CULTURE RESULTS: SIGNIFICANT CHANGE UP
N GONORRHOEA RRNA SPEC QL NAA+PROBE: SIGNIFICANT CHANGE UP
SPECIMEN SOURCE: SIGNIFICANT CHANGE UP

## 2022-07-09 LAB
CULTURE RESULTS: SIGNIFICANT CHANGE UP
CULTURE RESULTS: SIGNIFICANT CHANGE UP
SPECIMEN SOURCE: SIGNIFICANT CHANGE UP
SPECIMEN SOURCE: SIGNIFICANT CHANGE UP

## 2022-10-21 ENCOUNTER — RX RENEWAL (OUTPATIENT)
Age: 37
End: 2022-10-21

## 2023-04-26 ENCOUNTER — APPOINTMENT (OUTPATIENT)
Dept: OBGYN | Facility: CLINIC | Age: 38
End: 2023-04-26
Payer: MEDICAID

## 2023-04-26 VITALS
SYSTOLIC BLOOD PRESSURE: 114 MMHG | HEIGHT: 60 IN | DIASTOLIC BLOOD PRESSURE: 81 MMHG | WEIGHT: 191 LBS | BODY MASS INDEX: 37.5 KG/M2 | HEART RATE: 80 BPM

## 2023-04-26 DIAGNOSIS — Z01.411 ENCOUNTER FOR GYNECOLOGICAL EXAMINATION (GENERAL) (ROUTINE) WITH ABNORMAL FINDINGS: ICD-10-CM

## 2023-04-26 DIAGNOSIS — R35.0 FREQUENCY OF MICTURITION: ICD-10-CM

## 2023-04-26 DIAGNOSIS — N76.0 ACUTE VAGINITIS: ICD-10-CM

## 2023-04-26 PROCEDURE — 99395 PREV VISIT EST AGE 18-39: CPT | Mod: 25

## 2023-04-26 PROCEDURE — 99213 OFFICE O/P EST LOW 20 MIN: CPT | Mod: 25

## 2023-04-26 RX ORDER — METRONIDAZOLE 500 MG/1
500 TABLET ORAL TWICE DAILY
Qty: 14 | Refills: 0 | Status: COMPLETED | COMMUNITY
Start: 2022-02-22 | End: 2023-04-26

## 2023-04-26 RX ORDER — METRONIDAZOLE 7.5 MG/G
0.75 GEL VAGINAL
Qty: 1 | Refills: 0 | Status: COMPLETED | COMMUNITY
Start: 2022-02-16 | End: 2023-04-26

## 2023-04-26 NOTE — REVIEW OF SYSTEMS
[Nasal Discharge] : nasal discharge [Cough] : cough [Frequency] : frequency [Urethral Discharge] : urethral discharge [Back Pain] : back pain [Negative] : Heme/Lymph

## 2023-04-27 LAB
BACTERIA UR CULT: NORMAL
C TRACH RRNA SPEC QL NAA+PROBE: NOT DETECTED
ESTIMATED AVERAGE GLUCOSE: 200 MG/DL
HBA1C MFR BLD HPLC: 8.6 %
HBV SURFACE AG SER QL: NONREACTIVE
HCV AB SER QL: NONREACTIVE
HCV S/CO RATIO: 0.12 S/CO
HIV1+2 AB SPEC QL IA.RAPID: NONREACTIVE
HPV HIGH+LOW RISK DNA PNL CVX: NOT DETECTED
N GONORRHOEA RRNA SPEC QL NAA+PROBE: NOT DETECTED
SOURCE AMPLIFICATION: NORMAL
T PALLIDUM AB SER QL IA: NEGATIVE

## 2023-04-28 LAB
CANDIDA VAG CYTO: NOT DETECTED
G VAGINALIS+PREV SP MTYP VAG QL MICRO: DETECTED
T VAGINALIS VAG QL WET PREP: NOT DETECTED

## 2023-04-28 NOTE — HISTORY OF PRESENT ILLNESS
[FreeTextEntry1] : 37 year old LMP 4/15/23  presents for annual gyn visit. She has normal menses. She denies intermenstrual bleeding or abnormal discharge. She c/o runny nose, back pain, and a dry cough. Also c/o vaginal discharge white/gray in color with fishy odor. She also states she has herpes outbreaks 2-3 times a month. \par \par \par  [PapSmeardate] : 2/22/21

## 2023-05-04 LAB — CYTOLOGY CVX/VAG DOC THIN PREP: NORMAL

## 2023-07-27 ENCOUNTER — NON-APPOINTMENT (OUTPATIENT)
Age: 38
End: 2023-07-27

## 2023-08-07 NOTE — ED CDU PROVIDER DISPOSITION NOTE - CLINICAL COURSE
"Caller: Rupesh Fox \"Bakari\"    Relationship: Self    Best call back number: [phone]     What orders are you requesting (i.e. lab or imaging): LABS     In what timeframe would the patient need to come in: ASAP    Where will you receive your lab/imaging services: OFFICE    Additional notes: PATIENT STATES DR MATHUR REQUESTING ORDERS FOR NEW LABS ON CREATININE LEVELS AND BUN AS WELL    PLEASE CALL TO SCHEDULE THESE LABS FOR PATIENT.           "
36 yo female, PMH DM (on metformin), presented to the ED c/o dysuria, chills, myalgias, intermittent headaches x past few days.  In ED noted to have UA +large leukocytes / +nitrite.  RVP/COVID swab unremarkable.  CXR with no acute findings.  Pt. was treated for pyelonephritis with IV ceftriaxone and IV fluids; pt was dispo'd to CDU for continuing care plan:  IV antibiotics, IV hydration, monitor vitals, supportive care, AM labs, general observation care / monitoring.  This AM patient well appearing, c/o mild left flank pain.  AM labs revealed lactate 3.2, bolus being given, blood cultures sent, repeat lactate 1.3, dc

## 2023-08-20 ENCOUNTER — EMERGENCY (EMERGENCY)
Facility: HOSPITAL | Age: 38
LOS: 1 days | Discharge: HOME | End: 2023-08-20
Attending: EMERGENCY MEDICINE | Admitting: EMERGENCY MEDICINE
Payer: MEDICAID

## 2023-08-20 VITALS
HEART RATE: 68 BPM | OXYGEN SATURATION: 100 % | SYSTOLIC BLOOD PRESSURE: 139 MMHG | DIASTOLIC BLOOD PRESSURE: 79 MMHG | RESPIRATION RATE: 18 BRPM | TEMPERATURE: 98 F

## 2023-08-20 VITALS
RESPIRATION RATE: 16 BRPM | HEART RATE: 71 BPM | OXYGEN SATURATION: 100 % | TEMPERATURE: 98 F | DIASTOLIC BLOOD PRESSURE: 91 MMHG | SYSTOLIC BLOOD PRESSURE: 141 MMHG

## 2023-08-20 LAB
ALBUMIN SERPL ELPH-MCNC: 4.2 G/DL — SIGNIFICANT CHANGE UP (ref 3.3–5)
ALP SERPL-CCNC: 68 U/L — SIGNIFICANT CHANGE UP (ref 40–120)
ALT FLD-CCNC: 13 U/L — SIGNIFICANT CHANGE UP (ref 4–33)
ANION GAP SERPL CALC-SCNC: 13 MMOL/L — SIGNIFICANT CHANGE UP (ref 7–14)
APPEARANCE UR: CLEAR — SIGNIFICANT CHANGE UP
AST SERPL-CCNC: 15 U/L — SIGNIFICANT CHANGE UP (ref 4–32)
B-OH-BUTYR SERPL-SCNC: <0 MMOL/L — SIGNIFICANT CHANGE UP (ref 0–0.4)
BACTERIA # UR AUTO: NEGATIVE /HPF — SIGNIFICANT CHANGE UP
BASE EXCESS BLDV CALC-SCNC: 1.4 MMOL/L — SIGNIFICANT CHANGE UP (ref -2–3)
BASOPHILS # BLD AUTO: 0 K/UL — SIGNIFICANT CHANGE UP (ref 0–0.2)
BASOPHILS NFR BLD AUTO: 0 % — SIGNIFICANT CHANGE UP (ref 0–2)
BILIRUB SERPL-MCNC: 0.3 MG/DL — SIGNIFICANT CHANGE UP (ref 0.2–1.2)
BILIRUB UR-MCNC: NEGATIVE — SIGNIFICANT CHANGE UP
BLOOD GAS VENOUS COMPREHENSIVE RESULT: SIGNIFICANT CHANGE UP
BUN SERPL-MCNC: 9 MG/DL — SIGNIFICANT CHANGE UP (ref 7–23)
CALCIUM SERPL-MCNC: 9.6 MG/DL — SIGNIFICANT CHANGE UP (ref 8.4–10.5)
CHLORIDE BLDV-SCNC: 99 MMOL/L — SIGNIFICANT CHANGE UP (ref 96–108)
CHLORIDE SERPL-SCNC: 96 MMOL/L — LOW (ref 98–107)
CO2 BLDV-SCNC: 30.2 MMOL/L — HIGH (ref 22–26)
CO2 SERPL-SCNC: 23 MMOL/L — SIGNIFICANT CHANGE UP (ref 22–31)
COLOR SPEC: YELLOW — SIGNIFICANT CHANGE UP
CREAT SERPL-MCNC: 0.58 MG/DL — SIGNIFICANT CHANGE UP (ref 0.5–1.3)
DIFF PNL FLD: ABNORMAL
EGFR: 119 ML/MIN/1.73M2 — SIGNIFICANT CHANGE UP
EOSINOPHIL # BLD AUTO: 0.33 K/UL — SIGNIFICANT CHANGE UP (ref 0–0.5)
EOSINOPHIL NFR BLD AUTO: 2.7 % — SIGNIFICANT CHANGE UP (ref 0–6)
EPI CELLS # UR: PRESENT
GAS PNL BLDV: 135 MMOL/L — LOW (ref 136–145)
GLUCOSE BLDV-MCNC: 191 MG/DL — HIGH (ref 70–99)
GLUCOSE SERPL-MCNC: 198 MG/DL — HIGH (ref 70–99)
GLUCOSE UR QL: >=1000 MG/DL
HCG SERPL-ACNC: <1 MIU/ML — SIGNIFICANT CHANGE UP
HCO3 BLDV-SCNC: 28 MMOL/L — SIGNIFICANT CHANGE UP (ref 22–29)
HCT VFR BLD CALC: 41.7 % — SIGNIFICANT CHANGE UP (ref 34.5–45)
HCT VFR BLDA CALC: 42 % — SIGNIFICANT CHANGE UP (ref 34.5–46.5)
HGB BLD CALC-MCNC: 13.9 G/DL — SIGNIFICANT CHANGE UP (ref 11.7–16.1)
HGB BLD-MCNC: 13.7 G/DL — SIGNIFICANT CHANGE UP (ref 11.5–15.5)
IANC: 5.5 K/UL — SIGNIFICANT CHANGE UP (ref 1.8–7.4)
KETONES UR-MCNC: NEGATIVE MG/DL — SIGNIFICANT CHANGE UP
LACTATE BLDV-MCNC: 2.5 MMOL/L — HIGH (ref 0.5–2)
LEUKOCYTE ESTERASE UR-ACNC: NEGATIVE — SIGNIFICANT CHANGE UP
LYMPHOCYTES # BLD AUTO: 42.1 % — SIGNIFICANT CHANGE UP (ref 13–44)
LYMPHOCYTES # BLD AUTO: 5.09 K/UL — HIGH (ref 1–3.3)
MANUAL SMEAR VERIFICATION: SIGNIFICANT CHANGE UP
MCHC RBC-ENTMCNC: 30.5 PG — SIGNIFICANT CHANGE UP (ref 27–34)
MCHC RBC-ENTMCNC: 32.9 GM/DL — SIGNIFICANT CHANGE UP (ref 32–36)
MCV RBC AUTO: 92.9 FL — SIGNIFICANT CHANGE UP (ref 80–100)
MONOCYTES # BLD AUTO: 0.31 K/UL — SIGNIFICANT CHANGE UP (ref 0–0.9)
MONOCYTES NFR BLD AUTO: 2.6 % — SIGNIFICANT CHANGE UP (ref 2–14)
NEUTROPHILS # BLD AUTO: 5.93 K/UL — SIGNIFICANT CHANGE UP (ref 1.8–7.4)
NEUTROPHILS NFR BLD AUTO: 49.1 % — SIGNIFICANT CHANGE UP (ref 43–77)
NITRITE UR-MCNC: NEGATIVE — SIGNIFICANT CHANGE UP
PCO2 BLDV: 54 MMHG — HIGH (ref 39–52)
PH BLDV: 7.33 — SIGNIFICANT CHANGE UP (ref 7.32–7.43)
PH UR: 6 — SIGNIFICANT CHANGE UP (ref 5–8)
PLAT MORPH BLD: NORMAL — SIGNIFICANT CHANGE UP
PLATELET # BLD AUTO: 256 K/UL — SIGNIFICANT CHANGE UP (ref 150–400)
PLATELET COUNT - ESTIMATE: NORMAL — SIGNIFICANT CHANGE UP
PO2 BLDV: 26 MMHG — SIGNIFICANT CHANGE UP (ref 25–45)
POLYCHROMASIA BLD QL SMEAR: SLIGHT — SIGNIFICANT CHANGE UP
POTASSIUM BLDV-SCNC: 4.1 MMOL/L — SIGNIFICANT CHANGE UP (ref 3.5–5.1)
POTASSIUM SERPL-MCNC: 3.9 MMOL/L — SIGNIFICANT CHANGE UP (ref 3.5–5.3)
POTASSIUM SERPL-SCNC: 3.9 MMOL/L — SIGNIFICANT CHANGE UP (ref 3.5–5.3)
PROT SERPL-MCNC: 7.2 G/DL — SIGNIFICANT CHANGE UP (ref 6–8.3)
PROT UR-MCNC: NEGATIVE MG/DL — SIGNIFICANT CHANGE UP
RBC # BLD: 4.49 M/UL — SIGNIFICANT CHANGE UP (ref 3.8–5.2)
RBC # FLD: 12.3 % — SIGNIFICANT CHANGE UP (ref 10.3–14.5)
RBC BLD AUTO: NORMAL — SIGNIFICANT CHANGE UP
RBC CASTS # UR COMP ASSIST: 4 /HPF — SIGNIFICANT CHANGE UP (ref 0–4)
SAO2 % BLDV: 33.9 % — LOW (ref 67–88)
SODIUM SERPL-SCNC: 132 MMOL/L — LOW (ref 135–145)
SP GR SPEC: 1.01 — SIGNIFICANT CHANGE UP (ref 1–1.03)
UROBILINOGEN FLD QL: 0.2 MG/DL — SIGNIFICANT CHANGE UP (ref 0.2–1)
VARIANT LYMPHS # BLD: 3.5 % — SIGNIFICANT CHANGE UP (ref 0–6)
WBC # BLD: 12.08 K/UL — HIGH (ref 3.8–10.5)
WBC # FLD AUTO: 12.08 K/UL — HIGH (ref 3.8–10.5)
WBC UR QL: 3 /HPF — SIGNIFICANT CHANGE UP (ref 0–5)

## 2023-08-20 PROCEDURE — 99284 EMERGENCY DEPT VISIT MOD MDM: CPT

## 2023-08-20 RX ORDER — SODIUM CHLORIDE 9 MG/ML
1000 INJECTION INTRAMUSCULAR; INTRAVENOUS; SUBCUTANEOUS ONCE
Refills: 0 | Status: COMPLETED | OUTPATIENT
Start: 2023-08-20 | End: 2023-08-20

## 2023-08-20 RX ORDER — CEFTRIAXONE 500 MG/1
1000 INJECTION, POWDER, FOR SOLUTION INTRAMUSCULAR; INTRAVENOUS ONCE
Refills: 0 | Status: COMPLETED | OUTPATIENT
Start: 2023-08-20 | End: 2023-08-20

## 2023-08-20 RX ADMIN — CEFTRIAXONE 100 MILLIGRAM(S): 500 INJECTION, POWDER, FOR SOLUTION INTRAMUSCULAR; INTRAVENOUS at 04:58

## 2023-08-20 RX ADMIN — SODIUM CHLORIDE 1000 MILLILITER(S): 9 INJECTION INTRAMUSCULAR; INTRAVENOUS; SUBCUTANEOUS at 04:58

## 2023-08-20 NOTE — ED PROVIDER NOTE - NSFOLLOWUPCLINICS_GEN_ALL_ED_FT
[facility]rology  [address] [place] [address]  Phone: [phone]  Fax:   Follow Up Time: 1-3 Days

## 2023-08-20 NOTE — ED ADULT NURSE NOTE - NSFALLUNIVINTERV_ED_ALL_ED
no
Bed/Stretcher in lowest position, wheels locked, appropriate side rails in place/Call bell, personal items and telephone in reach/Instruct patient to call for assistance before getting out of bed/chair/stretcher/Non-slip footwear applied when patient is off stretcher/[place] to call system/Physically safe environment - no spills, clutter or unnecessary equipment/Purposeful proactive rounding/Room/bathroom lighting operational, light cord in reach

## 2023-08-20 NOTE — ED ADULT TRIAGE NOTE - NS ED NURSE BANDS TYPE
Reason for Call:  Other appointment    Detailed comments: Pt's , Janey called looking to schedule an appointment to establish care and for post hospitalization care.    Pt is said to have family members that see Lisy Viktoria.    Phone Number Patient can be reached at: Home number on file [phone] (home)    Best Time: Any time.    Can we leave a detailed message on this number? YES    Call taken on 1/7/2019 at 1:35 PM by Jame Singletary       Name band;

## 2023-08-20 NOTE — ED ADULT NURSE NOTE - OBJECTIVE STATEMENT
Pt A&Ox4  ambulatory, PMH DM, presenting to the ED (RM 5A) c/o vaginal bleeding, UTI. Pt  went to  due to urinary symptoms. Pt  was diagnosed with UTI at  and was prescribed antibiotics, states took antibiotics at home. States soon after taking antibiotics went to bathroom and noticed bright red when peeing and wiping. Pt  has been endorsing for the last 3 days urinary urgency, burning sensation upon urinating and lower back pain. Pt  decided to come to the ED due to vaginal bleeding. She states is concerned because this started after taking the first dose of cefdinir and she thought maybe it was side effect of medication. Denies any other complaints. Denies cp, sob, palpitations, dizziness, lightheadedness, n/v/d, fever, chills, cough, HA, blurry vision. Respirations are even and unlabored, NAD, 20G IV LAC, labs sent, medicated as per orders, Safety precautions implemented as per protocol, awaiting further MD orders, plan of care ongoing.

## 2023-08-20 NOTE — ED PROVIDER NOTE - DIFFERENTIAL DIAGNOSIS
-UTI status post 1 dose of oral antibiotics, will give IV ceftriaxone given diabetes is comorbidity  -Abnormal vaginal bleeding: No bleeding in the ED, will rule out pregnancy, may be aberrant uterine bleeding due to IUD, no pelvic pain  -Hyperglycemia, rule out DKA Differential Diagnosis

## 2023-08-20 NOTE — ED PROVIDER NOTE - PATIENT PORTAL LINK FT
You can access the FollowMyHealth Patient Portal offered by [facility] by registering at the following website: [email or web address]. By joining [facility]’s FollowMyHealth portal, you will also be able to view your health information using other applications (apps) compatible with our system.

## 2023-08-20 NOTE — ED PROVIDER NOTE - CARE PLAN
Principal Discharge DX:	Urinary tract infection  Secondary Diagnosis:	Abnormal vaginal bleeding  Secondary Diagnosis:	Hyperglycemia   1

## 2023-08-20 NOTE — ED PROVIDER NOTE - PHYSICAL EXAMINATION
GENERAL: Awake, alert, NAD  LUNGS: CTAB, no wheezes or crackles   CARDIAC: RRR, no m/r/g  ABDOMEN: Soft, , non tender, non distended, no rebound, no guarding  BACK: No midline spinal tenderness, no CVA tenderness  EXT: No edema, no calf tenderness, no deformities.  NEURO: A&Ox3. Moving all extremities.  SKIN: Warm and dry. No rash.  PSYCH: Normal affect.

## 2023-08-20 NOTE — ED PROVIDER NOTE - ATTENDING CONTRIBUTION TO CARE
Afebrile. Awake and Alert. Lungs CTA. Heart RRR. Abdomen soft NTND. CN II-XII grossly intact. Moves all extremities without lateralization. No CVA TTP.

## 2023-08-20 NOTE — ED ADULT TRIAGE NOTE - CHIEF COMPLAINT QUOTE
Pt arrives to ED following visit to [facility] and diagnosed with UTI.  Pt c/o lower back pain, hematuria, increased urinary frequency and burning during micturition and chills.  Lower back pain started 3 days ago.   Hx: kidney infection, DM  fs = 271

## 2023-08-20 NOTE — ED PROVIDER NOTE - CLINICAL SUMMARY MEDICAL DECISION MAKING FREE TEXT BOX
38-year-old female given history and physical clinical concern for probable cystitis versus pyelonephritis will assess with basics for creatinine/kidney damage in conjunction with probable ceftriaxone

## 2023-08-20 NOTE — ED PROVIDER NOTE - OBJECTIVE STATEMENT
38-year-old female history of diabetes chief complaint of hematuria and dysuria and frequency.  Patient notes that she had approximately 3 days of the symptoms has had UTIs in the past.  Patient went to urgent care was diagnosed with a UTI and was given cefdinir today.  Patient is also complaining of lower back pain.  Patient is endorsing chills but no fever 38-year-old female history of diabetes chief complaint of hematuria and dysuria and frequency.  Patient notes that she had approximately 3 days of the symptoms has had UTIs in the past.  Patient went to urgent care was diagnosed with a UTI and was given cefdinir today.  Patient is also complaining of lower back pain.  Patient is endorsing chills but no fever  Additional history attending, patient reports her chief complaint for coming to the ER was vaginal bleeding.  She is concerned because this started after taking the first dose of cefdinir and she thought maybe it was side effect of medication.  Bleeding is currently stopped.  Patient finished her last menstrual cycle last Friday  However she has an IUD and has irregular cycles at times.

## 2023-08-20 NOTE — ED PROVIDER NOTE - NSFOLLOWUPINSTRUCTIONS_ED_ALL_ED_FT
Please continue to take the cefdinir    Urine culture sent and can result in a couple of days you can call the number on the top left of the page to get the results in conjunction with if there is an abnormality we will call you.    You can also follow-up with urologist information/can be found below    A urinary tract infection (UTI) is an infection of any part of the urinary tract, which includes the kidneys, ureters, bladder, and urethra. Risk factors include ignoring your need to urinate, wiping back to front if female, being an uncircumcised male, and having diabetes or a weak immune system. Symptoms include frequent urination, pain or burning with urination, foul smelling urine, cloudy urine, pain in the lower abdomen, blood in the urine, and fever. If you were prescribed an antibiotic medicine, take it as told by your health care provider. Do not stop taking the antibiotic even if you start to feel better.    SEEK IMMEDIATE MEDICAL CARE IF YOU HAVE ANY OF THE FOLLOWING SYMPTOMS: severe back or abdominal pain, fever, inability to keep fluids or medicine down, dizziness/lightheadedness, or a change in mental status.

## 2023-08-21 LAB
CULTURE RESULTS: SIGNIFICANT CHANGE UP
SPECIMEN SOURCE: SIGNIFICANT CHANGE UP

## 2023-10-17 ENCOUNTER — RX RENEWAL (OUTPATIENT)
Age: 38
End: 2023-10-17

## 2023-10-27 NOTE — ED CDU PROVIDER DISPOSITION NOTE - NSPTACCESSSVCSAPPTDETAILS_ED_ALL_ED_FT
Called patient to review heart scan results.     CAC score is 6, 73rd percentile for age, gender, and race/ethnicity.    Discussed heart-healthy lifestyle and importance of risk factor modification with patient, as well as importance of continued compliance with PCP monitoring of/screenings for HTN, diabetes, cholesterol, etc. Patient denies any further questions or concerns at present.    Sent patient heart scan results/program letter and cardiac risk factors educational materials via [employer].    Patient agreeable to following up with Dr. Orellana regarding results; has 11/14/23 appt scheduled. Per chart review, Dr. Orellana is aware of results.   PCP and endocrine for diabetes management

## 2023-12-11 ENCOUNTER — RX RENEWAL (OUTPATIENT)
Age: 38
End: 2023-12-11

## 2023-12-11 DIAGNOSIS — N89.8 OTHER SPECIFIED NONINFLAMMATORY DISORDERS OF VAGINA: ICD-10-CM

## 2024-03-04 ENCOUNTER — RX RENEWAL (OUTPATIENT)
Age: 39
End: 2024-03-04

## 2024-03-04 ENCOUNTER — TRANSCRIPTION ENCOUNTER (OUTPATIENT)
Age: 39
End: 2024-03-04

## 2024-03-04 RX ORDER — VALACYCLOVIR 500 MG/1
500 TABLET, FILM COATED ORAL
Qty: 90 | Refills: 0 | Status: ACTIVE | COMMUNITY
Start: 2023-04-26 | End: 1900-01-01

## 2024-03-20 ENCOUNTER — APPOINTMENT (OUTPATIENT)
Dept: OBGYN | Facility: CLINIC | Age: 39
End: 2024-03-20
Payer: MEDICAID

## 2024-03-20 VITALS
BODY MASS INDEX: 38.09 KG/M2 | HEIGHT: 60 IN | HEART RATE: 84 BPM | WEIGHT: 194 LBS | DIASTOLIC BLOOD PRESSURE: 82 MMHG | SYSTOLIC BLOOD PRESSURE: 120 MMHG

## 2024-03-20 PROCEDURE — 99459 PELVIC EXAMINATION: CPT

## 2024-03-20 PROCEDURE — 99213 OFFICE O/P EST LOW 20 MIN: CPT

## 2024-03-20 RX ORDER — FLUCONAZOLE 150 MG/1
150 TABLET ORAL
Qty: 3 | Refills: 0 | Status: DISCONTINUED | COMMUNITY
Start: 2021-08-17 | End: 2024-03-20

## 2024-03-20 RX ORDER — ALPRAZOLAM 0.25 MG/1
0.25 TABLET ORAL
Qty: 20 | Refills: 0 | Status: DISCONTINUED | COMMUNITY
Start: 2020-08-17 | End: 2024-03-20

## 2024-03-20 RX ORDER — VALACYCLOVIR 1 G/1
1 TABLET, FILM COATED ORAL
Qty: 28 | Refills: 0 | Status: DISCONTINUED | COMMUNITY
Start: 2021-12-17 | End: 2024-03-20

## 2024-03-20 RX ORDER — METRONIDAZOLE 7.5 MG/G
0.75 GEL VAGINAL
Qty: 70 | Refills: 0 | Status: DISCONTINUED | COMMUNITY
Start: 2023-04-26 | End: 2024-03-20

## 2024-03-20 RX ORDER — ACYCLOVIR 50 MG/G
5 OINTMENT TOPICAL
Qty: 1 | Refills: 3 | Status: ACTIVE | COMMUNITY
Start: 2024-03-20 | End: 1900-01-01

## 2024-03-20 RX ORDER — METFORMIN ER 500 MG 500 MG/1
500 TABLET ORAL
Qty: 60 | Refills: 0 | Status: DISCONTINUED | COMMUNITY
Start: 2020-10-27 | End: 2024-03-20

## 2024-03-20 RX ORDER — LANCETS 28 GAUGE
EACH MISCELLANEOUS
Qty: 100 | Refills: 0 | Status: DISCONTINUED | COMMUNITY
Start: 2020-10-30 | End: 2024-03-20

## 2024-03-20 RX ORDER — FLUCONAZOLE 150 MG/1
150 TABLET ORAL
Qty: 1 | Refills: 0 | Status: DISCONTINUED | COMMUNITY
Start: 2020-09-23 | End: 2024-03-20

## 2024-03-20 RX ORDER — VALACYCLOVIR 1 G/1
1 TABLET, FILM COATED ORAL DAILY
Qty: 10 | Refills: 3 | Status: ACTIVE | COMMUNITY
Start: 2024-03-20 | End: 1900-01-01

## 2024-03-20 RX ORDER — BLOOD SUGAR DIAGNOSTIC
STRIP MISCELLANEOUS
Qty: 50 | Refills: 0 | Status: DISCONTINUED | COMMUNITY
Start: 2020-10-30 | End: 2024-03-20

## 2024-03-20 RX ORDER — TERCONAZOLE 8 MG/G
0.8 CREAM VAGINAL
Qty: 20 | Refills: 0 | Status: DISCONTINUED | COMMUNITY
Start: 2020-09-23 | End: 2024-03-20

## 2024-03-20 RX ORDER — VALACYCLOVIR 500 MG/1
500 TABLET, FILM COATED ORAL TWICE DAILY
Qty: 30 | Refills: 3 | Status: DISCONTINUED | COMMUNITY
Start: 2022-03-06 | End: 2024-03-20

## 2024-03-20 RX ORDER — GLIPIZIDE 2.5 MG/1
2.5 TABLET, FILM COATED, EXTENDED RELEASE ORAL
Qty: 30 | Refills: 0 | Status: DISCONTINUED | COMMUNITY
Start: 2020-08-03 | End: 2024-03-20

## 2024-03-21 RX ORDER — ACYCLOVIR 50 MG/G
5 CREAM TOPICAL
Qty: 1 | Refills: 3 | Status: ACTIVE | COMMUNITY
Start: 2024-03-21 | End: 1900-01-01

## 2024-05-22 ENCOUNTER — APPOINTMENT (OUTPATIENT)
Dept: OBGYN | Facility: CLINIC | Age: 39
End: 2024-05-22
Payer: MEDICAID

## 2024-05-22 VITALS
DIASTOLIC BLOOD PRESSURE: 81 MMHG | HEART RATE: 108 BPM | HEIGHT: 60 IN | BODY MASS INDEX: 38.09 KG/M2 | SYSTOLIC BLOOD PRESSURE: 119 MMHG | WEIGHT: 194 LBS

## 2024-05-22 DIAGNOSIS — Z11.3 ENCOUNTER FOR SCREENING FOR INFECTIONS WITH A PREDOMINANTLY SEXUAL MODE OF TRANSMISSION: ICD-10-CM

## 2024-05-22 DIAGNOSIS — Z01.419 ENCOUNTER FOR GYNECOLOGICAL EXAMINATION (GENERAL) (ROUTINE) W/OUT ABNORMAL FINDINGS: ICD-10-CM

## 2024-05-22 PROCEDURE — 99395 PREV VISIT EST AGE 18-39: CPT

## 2024-05-22 PROCEDURE — 99213 OFFICE O/P EST LOW 20 MIN: CPT | Mod: 25

## 2024-05-22 PROCEDURE — 96127 BRIEF EMOTIONAL/BEHAV ASSMT: CPT

## 2024-05-22 PROCEDURE — 99459 PELVIC EXAMINATION: CPT

## 2024-05-23 LAB
C TRACH RRNA SPEC QL NAA+PROBE: NOT DETECTED
HIV1+2 AB SPEC QL IA.RAPID: NONREACTIVE
HPV HIGH+LOW RISK DNA PNL CVX: NOT DETECTED
N GONORRHOEA RRNA SPEC QL NAA+PROBE: NOT DETECTED
SOURCE AMPLIFICATION: NORMAL
T PALLIDUM AB SER QL IA: NEGATIVE

## 2024-05-25 DIAGNOSIS — N76.0 ACUTE VAGINITIS: ICD-10-CM

## 2024-05-25 DIAGNOSIS — B96.89 ACUTE VAGINITIS: ICD-10-CM

## 2024-05-25 LAB
CANDIDA VAG CYTO: NOT DETECTED
G VAGINALIS+PREV SP MTYP VAG QL MICRO: DETECTED
HBV SURFACE AG SER QL: NONREACTIVE
HCV AB SER QL: NONREACTIVE
HCV S/CO RATIO: 0.09 S/CO
T VAGINALIS VAG QL WET PREP: NOT DETECTED

## 2024-05-25 RX ORDER — METRONIDAZOLE 7.5 MG/G
0.75 GEL VAGINAL
Qty: 1 | Refills: 0 | Status: ACTIVE | COMMUNITY
Start: 2024-05-25 | End: 1900-01-01

## 2024-05-30 LAB — CYTOLOGY CVX/VAG DOC THIN PREP: NORMAL

## 2024-05-30 NOTE — PHYSICAL EXAM
[Chaperone Present] : A chaperone was present in the examining room during all aspects of the physical examination [15271] : A chaperone was present during the pelvic exam. [Appropriately responsive] : appropriately responsive [Alert] : alert [No Acute Distress] : no acute distress [No Lymphadenopathy] : no lymphadenopathy [Regular Rate Rhythm] : regular rate rhythm [No Murmurs] : no murmurs [Clear to Auscultation B/L] : clear to auscultation bilaterally [Soft] : soft [Non-tender] : non-tender [Non-distended] : non-distended [No HSM] : No HSM [No Lesions] : no lesions [No Mass] : no mass [Oriented x3] : oriented x3 [Examination Of The Breasts] : a normal appearance [No Discharge] : no discharge [No Masses] : no breast masses were palpable [Labia Majora] : normal [Labia Minora] : normal [Normal] : normal [Uterine Adnexae] : normal [IUD String] : an IUD string was noted [FreeTextEntry2] : PAWAN Shin

## 2024-05-30 NOTE — PLAN
[FreeTextEntry1] : CHIQUITA  is a 38 year old female presenting for well woman exam.   HCM: Referrals for mammo/sono given today. GC/STI screening done today. Pap/HPV done today.  Genital Herpes: Pt advised to double her dose of Valtrex when she is having an outbreak.  vaginal discharge possible BV affirm swab sent rx for metrogel sent   RTO 1 year for annual gyn exam.

## 2024-05-30 NOTE — DISCUSSION/SUMMARY
[FreeTextEntry1] : This note was written by Bianca Marie on 05/22/2024  actively solely RAKEL Alvarez M.D.   All medical record entries made by this scribe at my, RAKEL Benavides M.D direction and personally dictated by me on 05/22/2024 . I have personally reviewed the chart and agree that the record reflects my personal performance of the history, physical exam, assessment, and plan.

## 2024-05-30 NOTE — COUNSELING
[Nutrition/ Exercise/ Weight Management] : nutrition, exercise, weight management [Vitamins/Supplements] : vitamins/supplements [Breast Self Exam] : breast self exam [FreeTextEntry2] :  patient screened for depression - no signs of clinical depression. EPDS scores reviewed over the course of the visit. 5-10 minutes of face to face time. Follow up with changes in mood including other symptoms of anxiety.

## 2024-05-30 NOTE — HISTORY OF PRESENT ILLNESS
Left message to follow-up with patient regarding the Metformin Dose Optimization Program.      Mindy Espinoza, PharmD, [facility]  Population Health Clinical Pharmacist  [email or web address]  [phone]     [Currently Active] : currently active [Men] : men [No] : No [FreeTextEntry1] : Pt is a 38 year old female, LMP 5/15/24, who presents for an annual GYN visit. Pt is content with her Mirena IUD. Pt reports her genital HSV lesions have been coming and going every 3 weeks for almost an entire year.. Pt reports the medication helps resolve the sxs. Pt takes 500mg of Valtrex once a day to help manage her genital herpes. Pt inquired about her new partner having HPV. All questions were answered to pt's apparent satisfaction.   patient also complains of discharge and odor.  [PapSmeardate] : 04/23 [FreeTextEntry3] : IUD

## 2024-07-03 ENCOUNTER — TRANSCRIPTION ENCOUNTER (OUTPATIENT)
Age: 39
End: 2024-07-03

## 2024-07-03 DIAGNOSIS — N76.0 ACUTE VAGINITIS: ICD-10-CM

## 2024-07-03 RX ORDER — METRONIDAZOLE 500 MG/1
500 TABLET ORAL TWICE DAILY
Qty: 14 | Refills: 0 | Status: ACTIVE | COMMUNITY
Start: 2024-07-03 | End: 1900-01-01

## 2024-08-26 ENCOUNTER — TRANSCRIPTION ENCOUNTER (OUTPATIENT)
Age: 39
End: 2024-08-26

## 2024-09-10 ENCOUNTER — APPOINTMENT (OUTPATIENT)
Dept: OBGYN | Facility: CLINIC | Age: 39
End: 2024-09-10

## 2024-09-18 ENCOUNTER — TRANSCRIPTION ENCOUNTER (OUTPATIENT)
Age: 39
End: 2024-09-18

## 2024-10-02 ENCOUNTER — TRANSCRIPTION ENCOUNTER (OUTPATIENT)
Age: 39
End: 2024-10-02

## 2024-11-20 ENCOUNTER — APPOINTMENT (OUTPATIENT)
Dept: OBGYN | Facility: CLINIC | Age: 39
End: 2024-11-20

## 2024-11-20 VITALS
HEIGHT: 60 IN | WEIGHT: 194 LBS | SYSTOLIC BLOOD PRESSURE: 101 MMHG | HEART RATE: 81 BPM | DIASTOLIC BLOOD PRESSURE: 64 MMHG | BODY MASS INDEX: 38.09 KG/M2

## 2024-11-20 DIAGNOSIS — Z87.42 PERSONAL HISTORY OF OTHER DISEASES OF THE FEMALE GENITAL TRACT: ICD-10-CM

## 2024-11-20 DIAGNOSIS — N89.8 OTHER SPECIFIED NONINFLAMMATORY DISORDERS OF VAGINA: ICD-10-CM

## 2024-11-20 PROCEDURE — 99459 PELVIC EXAMINATION: CPT

## 2024-11-20 PROCEDURE — 99214 OFFICE O/P EST MOD 30 MIN: CPT

## 2024-11-20 RX ORDER — METRONIDAZOLE 7.5 MG/G
0.75 GEL VAGINAL
Qty: 1 | Refills: 0 | Status: ACTIVE | COMMUNITY
Start: 2024-11-20 | End: 1900-01-01

## 2024-11-21 LAB
HBV SURFACE AG SER QL: NONREACTIVE
HCV AB SER QL: NONREACTIVE
HCV S/CO RATIO: 0.1 S/CO
HIV1+2 AB SPEC QL IA.RAPID: NONREACTIVE
T PALLIDUM AB SER QL IA: NEGATIVE

## 2024-11-25 LAB
BACTERIA UR CULT: NORMAL
BV BACTERIA RRNA VAG QL NAA+PROBE: DETECTED
C GLABRATA RNA VAG QL NAA+PROBE: NOT DETECTED
C TRACH RRNA SPEC QL NAA+PROBE: NOT DETECTED
CANDIDA RRNA VAG QL PROBE: DETECTED
N GONORRHOEA RRNA SPEC QL NAA+PROBE: NOT DETECTED
T VAGINALIS RRNA SPEC QL NAA+PROBE: NOT DETECTED

## 2024-11-25 RX ORDER — FLUCONAZOLE 150 MG/1
150 TABLET ORAL
Qty: 3 | Refills: 0 | Status: ACTIVE | COMMUNITY
Start: 2024-11-25 | End: 1900-01-01

## 2025-02-19 ENCOUNTER — TRANSCRIPTION ENCOUNTER (OUTPATIENT)
Age: 40
End: 2025-02-19

## 2025-03-14 ENCOUNTER — RX RENEWAL (OUTPATIENT)
Age: 40
End: 2025-03-14

## 2025-03-18 ENCOUNTER — APPOINTMENT (OUTPATIENT)
Dept: OBGYN | Facility: CLINIC | Age: 40
End: 2025-03-18
Payer: MEDICAID

## 2025-03-18 VITALS
WEIGHT: 198 LBS | DIASTOLIC BLOOD PRESSURE: 65 MMHG | SYSTOLIC BLOOD PRESSURE: 122 MMHG | HEART RATE: 86 BPM | HEIGHT: 60 IN | BODY MASS INDEX: 38.87 KG/M2

## 2025-03-18 DIAGNOSIS — Z87.42 PERSONAL HISTORY OF OTHER DISEASES OF THE FEMALE GENITAL TRACT: ICD-10-CM

## 2025-03-18 DIAGNOSIS — N89.8 OTHER SPECIFIED NONINFLAMMATORY DISORDERS OF VAGINA: ICD-10-CM

## 2025-03-18 DIAGNOSIS — Z11.3 ENCOUNTER FOR SCREENING FOR INFECTIONS WITH A PREDOMINANTLY SEXUAL MODE OF TRANSMISSION: ICD-10-CM

## 2025-03-18 PROCEDURE — 99459 PELVIC EXAMINATION: CPT

## 2025-03-18 PROCEDURE — 99214 OFFICE O/P EST MOD 30 MIN: CPT

## 2025-03-18 RX ORDER — METRONIDAZOLE 7.5 MG/G
0.75 GEL VAGINAL
Qty: 1 | Refills: 1 | Status: ACTIVE | COMMUNITY
Start: 2025-03-18 | End: 1900-01-01

## 2025-03-19 LAB
HBV SURFACE AG SER QL: NONREACTIVE
HCV AB SER QL: NONREACTIVE
HCV S/CO RATIO: 0.07 S/CO
HIV1+2 AB SPEC QL IA.RAPID: NONREACTIVE
T PALLIDUM AB SER QL IA: NEGATIVE

## 2025-08-13 ENCOUNTER — TRANSCRIPTION ENCOUNTER (OUTPATIENT)
Age: 40
End: 2025-08-13

## 2025-08-14 ENCOUNTER — TRANSCRIPTION ENCOUNTER (OUTPATIENT)
Age: 40
End: 2025-08-14

## 2025-09-18 ENCOUNTER — TRANSCRIPTION ENCOUNTER (OUTPATIENT)
Age: 40
End: 2025-09-18